# Patient Record
Sex: MALE | Race: WHITE | Employment: FULL TIME | URBAN - METROPOLITAN AREA
[De-identification: names, ages, dates, MRNs, and addresses within clinical notes are randomized per-mention and may not be internally consistent; named-entity substitution may affect disease eponyms.]

---

## 2021-04-08 ENCOUNTER — OFFICE VISIT (OUTPATIENT)
Dept: PRIMARY CARE CLINIC | Age: 26
End: 2021-04-08
Payer: OTHER GOVERNMENT

## 2021-04-08 VITALS
HEART RATE: 105 BPM | BODY MASS INDEX: 31.22 KG/M2 | OXYGEN SATURATION: 98 % | HEIGHT: 69 IN | TEMPERATURE: 97.9 F | WEIGHT: 210.8 LBS | SYSTOLIC BLOOD PRESSURE: 126 MMHG | DIASTOLIC BLOOD PRESSURE: 74 MMHG

## 2021-04-08 DIAGNOSIS — Z13.220 SCREENING CHOLESTEROL LEVEL: ICD-10-CM

## 2021-04-08 DIAGNOSIS — Z13.1 DIABETES MELLITUS SCREENING: ICD-10-CM

## 2021-04-08 DIAGNOSIS — M51.26 LUMBAR HERNIATED DISC: ICD-10-CM

## 2021-04-08 DIAGNOSIS — Z00.00 WELLNESS EXAMINATION: Primary | ICD-10-CM

## 2021-04-08 PROCEDURE — 99385 PREV VISIT NEW AGE 18-39: CPT | Performed by: FAMILY MEDICINE

## 2021-04-08 ASSESSMENT — ENCOUNTER SYMPTOMS: RESPIRATORY NEGATIVE: 1

## 2021-04-08 ASSESSMENT — PATIENT HEALTH QUESTIONNAIRE - PHQ9
SUM OF ALL RESPONSES TO PHQ QUESTIONS 1-9: 0
SUM OF ALL RESPONSES TO PHQ QUESTIONS 1-9: 0
SUM OF ALL RESPONSES TO PHQ9 QUESTIONS 1 & 2: 0
SUM OF ALL RESPONSES TO PHQ QUESTIONS 1-9: 0

## 2021-04-08 NOTE — PROGRESS NOTES
[address] [facility] PRIMARY CARE  [address] [facility] [address]  Dept: Farrukh Phelps is a 23 y.o. male New patient, who presents today for his medical conditions/complaintsas noted below. Chief Complaint   Patient presents with    New Patient     Pt would like to discuss plan for back pain    Referral - General     PT       HPI:     HPI  Diet: healthy  Exercises regularly  Can't run or lift heavy weights. Sees dentist: last year  Haven't seen eye doctor. 2 herniated discs in lumbar spine at previous post.   Back pain and radiates to buttocks, pinching pain. No change in body position. Problems x [id number]. Current in [facility],  and     Reviewed prior notes None  Reviewed previous none    No results found for: Toya Pearson    (goal LDL is <100)   No results found for: AST, ALT, BUN, LABA1C, TSH  BP Readings from Last 3 Encounters:   04/08/21 126/74          (goal 120/80)    Past Medical History:   Diagnosis Date    Chronic back pain       History reviewed. No pertinent surgical history. Family History   Problem Relation Age of Onset    Heart Failure Mother     Diabetes Father        Social History     Tobacco Use    Smoking status: Never Smoker    Smokeless tobacco: Never Used   Substance Use Topics    Alcohol use: Never     Frequency: Never      No current outpatient medications on file. No current facility-administered medications for this visit.       No Known Allergies    Health Maintenance   Topic Date Due    Hepatitis C screen  Never done    Varicella vaccine (1 of 2 - 2-dose childhood series) Never done    HPV vaccine (1 - Male 2-dose series) Never done    HIV screen  Never done    COVID-19 Vaccine (1) Never done    DTaP/Tdap/Td vaccine (1 - Tdap) Never done    Flu vaccine (Season Ended) 09/01/2021    Hepatitis A vaccine  Aged Out    Hepatitis B vaccine  Aged Out    Hib vaccine  Aged C/ Dariel Fredi 19 Meningococcal (ACWY) vaccine  Aged Out    Pneumococcal 0-64 years Vaccine  Aged Out       Subjective:      Review of Systems   Respiratory: Negative. Cardiovascular: Negative. Objective:     /74   Pulse 105   Temp 97.9 °F (36.6 °C)   Ht 5' 8.98\" (1.752 m)   Wt 210 lb 12.8 oz (95.6 kg)   SpO2 98%   BMI 31.15 kg/m²   Physical Exam  Vitals signs and nursing note reviewed. Constitutional:       General: He is not in acute distress. Appearance: He is well-developed. He is not diaphoretic. HENT:      Head: Normocephalic and atraumatic. Right Ear: Tympanic membrane, ear canal and external ear normal.      Left Ear: Tympanic membrane, ear canal and external ear normal.      Mouth/Throat:      Pharynx: No oropharyngeal exudate. Eyes:      General:         Right eye: No discharge. Left eye: No discharge. Conjunctiva/sclera: Conjunctivae normal.      Pupils: Pupils are equal, round, and reactive to light. Neck:      Thyroid: No thyromegaly. Trachea: No tracheal deviation. Cardiovascular:      Rate and Rhythm: Normal rate and regular rhythm. Heart sounds: Normal heart sounds. No murmur. Comments: No carotid bruits      Pulmonary:      Effort: Pulmonary effort is normal. No respiratory distress. Breath sounds: Normal breath sounds. No wheezing. Abdominal:      General: Bowel sounds are normal. There is no distension. Palpations: Abdomen is soft. Tenderness: There is no abdominal tenderness. Musculoskeletal:      Right lower leg: No edema. Left lower leg: No edema. Comments: Lumbar ROM wnl, no pain on palpitation. Lymphadenopathy:      Cervical: No cervical adenopathy. Skin:     General: Skin is warm and dry. Findings: No erythema. Neurological:      Mental Status: He is alert and oriented to person, place, and time. Cranial Nerves: No cranial nerve deficit.       Deep Tendon Reflexes:      Reflex Scores: Patellar reflexes are 3+ on the right side and 2+ on the left side. Achilles reflexes are 2+ on the right side and 1+ on the left side. Comments: Toes dorsiflexion 5/5  Knees extension strength: 4+/5 bilaerally  Neg SLR  Piriformis stretch wnl. Feels tightness     Psychiatric:         Mood and Affect: Mood normal.         Behavior: Behavior normal.         Thought Content: Thought content normal.         Assessment:       Diagnosis Orders   1. Wellness examination  Lipid Panel    Glucose, Fasting   2. Lumbar herniated disc  External Referral To Physical Therapy   3. Diabetes mellitus screening  Glucose, Fasting   4. Screening cholesterol level  Lipid Panel        Plan:      No follow-ups on file. Needs copy of MR report and shot records  PT for back, call prn. Orders Placed This Encounter   Procedures    Lipid Panel     Standing Status:   Future     Standing Expiration Date:   4/8/2022     Order Specific Question:   Is Patient Fasting?/# of Hours     Answer:   yes    Glucose, Fasting     Standing Status:   Future     Standing Expiration Date:   4/8/2022    External Referral To Physical Therapy     Referral Priority:   Routine     Referral Type:   Eval and Treat     Referral Reason:   Specialty Services Required     Requested Specialty:   Physical Therapy     Number of Visits Requested:   1     No orders of the defined types were placed in this encounter. Patient given educationalmaterials - see patient instructions. Discussed use, benefit, and side effectsof prescribed medications. All patient questions answered. Pt voiced understanding. Reviewed health maintenance. Instructed to increase veggies in diet andexercise. Patient agreed with treatment plan. Follow up as directed.      Electronicallysigned by Aida Holden MD on 4/8/2021 at 3:35 PM

## 2021-04-08 NOTE — PATIENT INSTRUCTIONS
extension   1. Lie on your stomach, with your face down and your elbows tucked into your sides and under your shoulders. 2. Press your elbows down into the floor to raise your upper back. 3. Hold this position for 15 to 30 seconds. 4. Repeat 2 to 4 times. · As you do this, relax your stomach muscles and allow your back to arch without using your back muscles. · Let your low back relax completely as you arch up. Don't let your hips or pelvis come off the floor. Then relax, and return to the start position. Over time, work up to staying in the press-up position for up to 2 minutes. If lying in this position causes or increases pain down your leg, stop this exercise and do not do the next exercises. 3. Full press-up back extension   1. Lie on your stomach with your face down, keeping your elbows tucked into your sides and under your shoulders. 2. Straighten your elbows, and push your upper body up as far as you can. 3. Hold this position for 5 seconds, and then relax. 4. Repeat 10 times. Allow your lower back to sag. Keep your hips, pelvis, and legs relaxed. Each time, try to raise your upper body a little higher and hold your arms a bit straighter. If lying in this position causes or increases pain down your leg, stop this exercise and do not do the next exercises. If you can't do this exercise, you may instead try the backward bend exercise that follows. 4. Backward bend   1. Stand with your feet hip-width apart, and don't lock your knees. 2. Place your hands in the small of your back. 3. Bend backward as far as you can, keeping your knees straight. 4. Repeat 2 to 4 times. Your toes should be pointing forward. Hold this position for 2 to 3 seconds. Then return to your starting position. Each time, try to bend backward a little farther, until you bend backward as far as you can. If standing in this position causes or increases pain down your leg, stop this exercise.     Follow-up care is a

## 2021-04-14 ENCOUNTER — TELEPHONE (OUTPATIENT)
Dept: PRIMARY CARE CLINIC | Age: 26
End: 2021-04-14

## 2021-04-14 NOTE — TELEPHONE ENCOUNTER
Pt stated that he has been busy and will call office when he finds a PT facility that he would like to attend.

## 2021-04-22 ENCOUNTER — HOSPITAL ENCOUNTER (OUTPATIENT)
Age: 26
Setting detail: SPECIMEN
Discharge: HOME | End: 2021-04-22
Payer: OTHER GOVERNMENT

## 2021-04-22 DIAGNOSIS — Z13.220 SCREENING CHOLESTEROL LEVEL: ICD-10-CM

## 2021-04-22 DIAGNOSIS — Z13.1 DIABETES MELLITUS SCREENING: ICD-10-CM

## 2021-04-22 DIAGNOSIS — Z00.00 WELLNESS EXAMINATION: ICD-10-CM

## 2021-04-22 LAB
CHOLESTEROL/HDL RATIO: 4
CHOLESTEROL: 175 MG/DL
GLUCOSE FASTING: 99 MG/DL (ref 70–99)
HDLC SERPL-MCNC: 44 MG/DL
LDL CHOLESTEROL: 114 MG/DL (ref 0–130)
TRIGL SERPL-MCNC: 87 MG/DL
VLDLC SERPL CALC-MCNC: NORMAL MG/DL (ref 1–30)

## 2021-04-26 ENCOUNTER — TELEPHONE (OUTPATIENT)
Dept: PRIMARY CARE CLINIC | Age: 26
End: 2021-04-26

## 2021-04-26 NOTE — TELEPHONE ENCOUNTER
Patient is going to [facility]  at [facility] for physical therapy. He needs a  referral. Please update pt with response once received.

## 2021-08-30 ENCOUNTER — OFFICE VISIT (OUTPATIENT)
Dept: FAMILY MEDICINE CLINIC | Age: 26
End: 2021-08-30
Payer: OTHER GOVERNMENT

## 2021-08-30 ENCOUNTER — HOSPITAL ENCOUNTER (OUTPATIENT)
Age: 26
Setting detail: SPECIMEN
Discharge: HOME | End: 2021-08-30
Payer: OTHER GOVERNMENT

## 2021-08-30 VITALS — WEIGHT: 210 LBS | BODY MASS INDEX: 31.83 KG/M2 | OXYGEN SATURATION: 98 % | HEIGHT: 68 IN | RESPIRATION RATE: 12 BRPM

## 2021-08-30 DIAGNOSIS — Z11.52 ENCOUNTER FOR SCREENING FOR COVID-19: ICD-10-CM

## 2021-08-30 DIAGNOSIS — J06.9 VIRAL UPPER RESPIRATORY INFECTION: Primary | ICD-10-CM

## 2021-08-30 PROCEDURE — 99212 OFFICE O/P EST SF 10 MIN: CPT | Performed by: NURSE PRACTITIONER

## 2021-08-30 ASSESSMENT — ENCOUNTER SYMPTOMS
COUGH: 1
SINUS PRESSURE: 1

## 2021-08-30 NOTE — PROGRESS NOTES
[address] WALK-IN  [address] [place] [address]  Dept: [phone]  Dept Fax: [phone]    Date of Visit:  2021  Patient Name: Allie Phelps   Patient :  1995     CHIEF COMPLAINT:     Allie Phelps is a 23 y.o. male who presents today is c/o of Sinus Problem (x3 days daughter is sick and is in ) and Cough          REVIEW OF SYSTEM      Review of Systems   HENT: Positive for congestion, postnasal drip and sinus pressure. Respiratory: Positive for cough. All other systems reviewed and are negative. HISTORY OF PRESENT ILLNESS     Patient presents with his wife and child with c/o upper respiratory sinus symptoms for the past 3 days. Patient and his wife are all experiencing symptoms. He has been fully vaccinated. His daughter recently started  and due to COVID related symptoms, needs to have a negative PCR test to return to school. Twin Pand[place] REVIEWED INFORMATION      No Known Allergies    There is no problem list on file for this patient. Past Medical History:   Diagnosis Date    Chronic back pain        History reviewed. No pertinent surgical history. PHYSICAL EXAM     Resp 12   Ht 5' 8\" (1.727 m)   Wt 210 lb (95.3 kg)   SpO2 98%   BMI 31.93 kg/m²    Physical Exam  HENT:      Right Ear: Tympanic membrane normal.      Left Ear: Tympanic membrane normal.      Nose: Congestion present. Mouth/Throat:      Pharynx: Posterior oropharyngeal erythema present. No oropharyngeal exudate. Comments: Post nasal drainage  Cardiovascular:      Rate and Rhythm: Normal rate and regular rhythm. Pulses: Normal pulses. Heart sounds: Normal heart sounds. Pulmonary:      Effort: Pulmonary effort is normal.      Breath sounds: Normal breath sounds. Abdominal:      General: Bowel sounds are normal.   Skin:     General: Skin is warm and dry. Neurological:      Mental Status: He is alert.

## 2021-08-30 NOTE — PATIENT INSTRUCTIONS
Patient Education        Viral Respiratory Infection: Care Instructions  Your Care Instructions     Viruses are very small organisms. They grow in number after they enter your body. There are many types that cause different illnesses, such as colds and the mumps. The symptoms of a viral respiratory infection often start quickly. They include a fever, sore throat, and runny nose. You may also just not feel well. Or you may not want to eat much. Most viral respiratory infections are not serious. They usually get better with time and self-care. Antibiotics are not used to treat a viral infection. That's because antibiotics will not help cure a viral illness. In some cases, antiviral medicine can help your body fight a serious viral infection. Follow-up care is a key part of your treatment and safety. Be sure to make and go to all appointments, and call your doctor if you are having problems. It's also a good idea to know your test results and keep a list of the medicines you take. How can you care for yourself at home? · Rest as much as possible until you feel better. · Be safe with medicines. Take your medicine exactly as prescribed. Call your doctor if you think you are having a problem with your medicine. You will get more details on the specific medicine your doctor prescribes. · Take an over-the-counter pain medicine, such as acetaminophen (Tylenol), ibuprofen (Advil, Motrin), or naproxen (Aleve), as needed for pain and fever. Read and follow all instructions on the label. Do not give aspirin to anyone younger than 20. It has been linked to Reye syndrome, a serious illness. · Drink plenty of fluids. Hot fluids, such as tea or soup, may help relieve congestion in your nose and throat. If you have kidney, heart, or liver disease and have to limit fluids, talk with your doctor before you increase the amount of fluids you drink. · Try to clear mucus from your lungs by breathing deeply and coughing.   · Gargle

## 2021-08-31 ENCOUNTER — PATIENT MESSAGE (OUTPATIENT)
Dept: PRIMARY CARE CLINIC | Age: 26
End: 2021-08-31

## 2021-08-31 DIAGNOSIS — Z11.52 ENCOUNTER FOR SCREENING FOR COVID-19: ICD-10-CM

## 2021-08-31 LAB
SARS-COV-2: NORMAL
SARS-COV-2: NOT DETECTED
SOURCE: NORMAL

## 2021-09-02 ENCOUNTER — OFFICE VISIT (OUTPATIENT)
Dept: PRIMARY CARE CLINIC | Age: 26
End: 2021-09-02
Payer: OTHER GOVERNMENT

## 2021-09-02 VITALS
DIASTOLIC BLOOD PRESSURE: 80 MMHG | OXYGEN SATURATION: 96 % | BODY MASS INDEX: 34.18 KG/M2 | HEART RATE: 84 BPM | SYSTOLIC BLOOD PRESSURE: 120 MMHG | WEIGHT: 224.8 LBS

## 2021-09-02 DIAGNOSIS — R10.31 GROIN PAIN, CHRONIC, RIGHT: Primary | ICD-10-CM

## 2021-09-02 DIAGNOSIS — Z23 NEED FOR INFLUENZA VACCINATION: ICD-10-CM

## 2021-09-02 DIAGNOSIS — N50.811 PAIN IN RIGHT TESTICLE: ICD-10-CM

## 2021-09-02 DIAGNOSIS — G89.29 GROIN PAIN, CHRONIC, RIGHT: Primary | ICD-10-CM

## 2021-09-02 PROCEDURE — 99213 OFFICE O/P EST LOW 20 MIN: CPT | Performed by: FAMILY MEDICINE

## 2021-09-02 PROCEDURE — 90674 CCIIV4 VAC NO PRSV 0.5 ML IM: CPT | Performed by: FAMILY MEDICINE

## 2021-09-02 PROCEDURE — 90471 IMMUNIZATION ADMIN: CPT | Performed by: FAMILY MEDICINE

## 2021-09-02 RX ORDER — AMOXICILLIN 500 MG/1
CAPSULE ORAL
COMMUNITY
Start: 2021-08-27 | End: 2021-11-19 | Stop reason: ALTCHOICE

## 2021-09-02 SDOH — ECONOMIC STABILITY: FOOD INSECURITY: WITHIN THE PAST 12 MONTHS, YOU WORRIED THAT YOUR FOOD WOULD RUN OUT BEFORE YOU GOT MONEY TO BUY MORE.: NEVER TRUE

## 2021-09-02 SDOH — ECONOMIC STABILITY: FOOD INSECURITY: WITHIN THE PAST 12 MONTHS, THE FOOD YOU BOUGHT JUST DIDN'T LAST AND YOU DIDN'T HAVE MONEY TO GET MORE.: NEVER TRUE

## 2021-09-02 ASSESSMENT — PATIENT HEALTH QUESTIONNAIRE - PHQ9
SUM OF ALL RESPONSES TO PHQ QUESTIONS 1-9: 0
2. FEELING DOWN, DEPRESSED OR HOPELESS: 0
1. LITTLE INTEREST OR PLEASURE IN DOING THINGS: 0
SUM OF ALL RESPONSES TO PHQ9 QUESTIONS 1 & 2: 0
SUM OF ALL RESPONSES TO PHQ QUESTIONS 1-9: 0
SUM OF ALL RESPONSES TO PHQ QUESTIONS 1-9: 0

## 2021-09-02 ASSESSMENT — SOCIAL DETERMINANTS OF HEALTH (SDOH): HOW HARD IS IT FOR YOU TO PAY FOR THE VERY BASICS LIKE FOOD, HOUSING, MEDICAL CARE, AND HEATING?: NOT HARD AT ALL

## 2021-09-02 NOTE — PROGRESS NOTES
 Hepatitis B vaccine  Aged Out    Hib vaccine  Aged Out    Meningococcal (ACWY) vaccine  Aged Out    Pneumococcal 0-64 years Vaccine  Aged Out       Subjective:      Review of Systems   Constitutional: Negative. Objective:     /80   Pulse 84   Wt 224 lb 12.8 oz (102 kg)   SpO2 96%   BMI 34.18 kg/m²   Physical Exam  Vitals and nursing note reviewed. Exam conducted with a chaperone present. Constitutional:       General: He is not in acute distress. Appearance: He is well-developed. He is not ill-appearing. HENT:      Head: Normocephalic and atraumatic. Right Ear: External ear normal.      Left Ear: External ear normal.   Eyes:      General: No scleral icterus. Right eye: No discharge. Left eye: No discharge. Conjunctiva/sclera: Conjunctivae normal.   Neck:      Thyroid: No thyromegaly. Trachea: No tracheal deviation. Cardiovascular:      Rate and Rhythm: Normal rate and regular rhythm. Heart sounds: Normal heart sounds. Pulmonary:      Effort: Pulmonary effort is normal. No respiratory distress. Breath sounds: Normal breath sounds. No wheezing. Genitourinary:     Penis: Normal and circumcised. Comments: Small cystic lesion superior to the right testes, not tender to palpation on cyst or testes  No pain on palpation of groin crease right   No hernia palpated right. Lymphadenopathy:      Cervical: No cervical adenopathy. Skin:     General: Skin is warm. Findings: No rash. Neurological:      Mental Status: He is alert and oriented to person, place, and time. Psychiatric:         Mood and Affect: Mood normal.         Behavior: Behavior normal.         Thought Content: Thought content normal.         Assessment:       Diagnosis Orders   1. Groin pain, chronic, right  AFL - Cherylene Durie, MD, Urology, [place]   2. Pain in right testicle  AFL - Cherylene Durie, MD, Urology, [place]   3.  Need for influenza vaccination INFLUENZA, MDCK QUADV, 2 YRS AND OLDER, IM, PF, PREFILL SYR OR SDV, 0.5ML (FLUCELVAX QUADV, PF)        Plan:      No follow-ups on file. See urology  It could be a ligament or nerve issue, I don't think the cyst is causing the pain unless it's on a nerve    Orders Placed This Encounter   Procedures    INFLUENZA, MDCK QUADV, 2 YRS AND OLDER, IM, PF, PREFILL SYR OR SDV, 0.5ML (Gerard Lambert, PF)   Danilo Contreras MD, Urology, [facility]     Referral Priority:   Routine     Referral Type:   Eval and Treat     Referral Reason:   Specialty Services Required     Referred to Provider:   Malick Jesus MD     Requested Specialty:   Urology     Number of Visits Requested:   1     No orders of the defined types were placed in this encounter. Patient given educationalmaterials - see patient instructions. Discussed use, benefit, and side effectsof prescribed medications. All patient questions answered. Pt voiced understanding. Reviewed health maintenance. Instructed to continue current medications, diet andexercise. Patient agreed with treatment plan. Follow up as directed.      Electronicallysigned by Dayton Cespedes MD on 9/2/2021 at 9:05 AM

## 2021-09-02 NOTE — PATIENT INSTRUCTIONS
vaccine:  · Has had an allergic reaction after a previous dose of influenza vaccine, or has any severe, life-threatening allergies. · Has ever had Guillain-Barré Syndrome (also called GBS). In some cases, your health care provider may decide to postpone influenza vaccination to a future visit. People with minor illnesses, such as a cold, may be vaccinated. People who are moderately or severely ill should usually wait until they recover before getting influenza vaccine. Your health care provider can give you more information. Risks of a vaccine reaction  · Soreness, redness, and swelling where shot is given, fever, muscle aches, and headache can happen after influenza vaccine. · There may be a very small increased risk of Guillain-Barré Syndrome (GBS) after inactivated influenza vaccine (the flu shot). Hiu Speck children who get the flu shot along with pneumococcal vaccine (PCV13), and/or DTaP vaccine at the same time might be slightly more likely to have a seizure caused by fever. Tell your health care provider if a child who is getting flu vaccine has ever had a seizure. People sometimes faint after medical procedures, including vaccination. Tell your provider if you feel dizzy or have vision changes or ringing in the ears. As with any medicine, there is a very remote chance of a vaccine causing a severe allergic reaction, other serious injury, or death. What if there is a serious problem? An allergic reaction could occur after the vaccinated person leaves the clinic. If you see signs of a severe allergic reaction (hives, swelling of the face and throat, difficulty breathing, a fast heartbeat, dizziness, or weakness), call 9-1-1 and get the person to the nearest hospital.  For other signs that concern you, call your health care provider. Adverse reactions should be reported to the Vaccine Adverse Event Reporting System (VAERS).  Your health care provider will usually file this report, or you can do it yourself. Visit the VAERS website at www.vaers. hhs.gov or call 3-348.472.9909. VAERS is only for reporting reactions, and VAERS staff do not give medical advice. The National Vaccine Injury Compensation Program  The National Vaccine Injury Compensation Program (VICP) is a federal program that was created to compensate people who may have been injured by certain vaccines. Visit the VICP website at www.hrsa.gov/vaccinecompensation or call 9-595.963.1412 to learn about the program and about filing a claim. There is a time limit to file a claim for compensation. How can I learn more? · Ask your healthcare provider. · Call your local or state health department. · Contact the Centers for Disease Control and Prevention (CDC):  ? Call 8-488.385.4467 (1-800-CDC-INFO) or  ? Visit CDC's website at www.cdc.gov/flu  Vaccine Information Statement (Interim)  Inactivated Influenza Vaccine  8/15/2019  42 UAkira Renner [id number]-[id number]  Department of Health and Human Services  Centers for Disease Control and Prevention  Many Vaccine Information Statements are available in Indonesian and other languages. See www.immunize.org/vis. Muchas hojas de información sobre vacunas están disponibles en español y en otros idiomas. Visite www.immunize.org/vis. Care instructions adapted under license by [facility] ([facility]). If you have questions about a medical condition or this instruction, always ask your healthcare professional. [place] [address] any warranty or liability for your use of this information.

## 2021-09-30 ENCOUNTER — HOSPITAL ENCOUNTER (EMERGENCY)
Age: 26
Discharge: HOME | End: 2021-09-30
Attending: EMERGENCY MEDICINE
Payer: OTHER GOVERNMENT

## 2021-09-30 VITALS
HEART RATE: 90 BPM | RESPIRATION RATE: 15 BRPM | HEIGHT: 69 IN | BODY MASS INDEX: 33.33 KG/M2 | DIASTOLIC BLOOD PRESSURE: 78 MMHG | WEIGHT: 225 LBS | SYSTOLIC BLOOD PRESSURE: 133 MMHG | OXYGEN SATURATION: 99 % | TEMPERATURE: 98.2 F

## 2021-09-30 DIAGNOSIS — R11.0 NAUSEA: Primary | ICD-10-CM

## 2021-09-30 DIAGNOSIS — R19.7 DIARRHEA, UNSPECIFIED TYPE: ICD-10-CM

## 2021-09-30 LAB
ABSOLUTE EOS #: 0 K/UL (ref 0–0.4)
ABSOLUTE IMMATURE GRANULOCYTE: ABNORMAL K/UL (ref 0–0.3)
ABSOLUTE LYMPH #: 1.2 K/UL (ref 1–4.8)
ABSOLUTE MONO #: 0.8 K/UL (ref 0.1–1.2)
ALBUMIN SERPL-MCNC: 4.3 G/DL (ref 3.5–5.2)
ALBUMIN/GLOBULIN RATIO: 1.3 (ref 1–2.5)
ALP BLD-CCNC: 51 U/L (ref 40–129)
ALT SERPL-CCNC: 21 U/L (ref 5–41)
ANION GAP SERPL CALCULATED.3IONS-SCNC: 15 MMOL/L (ref 9–17)
AST SERPL-CCNC: 19 U/L
BASOPHILS # BLD: 0 % (ref 0–2)
BASOPHILS ABSOLUTE: 0 K/UL (ref 0–0.2)
BILIRUB SERPL-MCNC: 0.5 MG/DL (ref 0.3–1.2)
BUN BLDV-MCNC: 18 MG/DL (ref 6–20)
BUN/CREAT BLD: ABNORMAL (ref 9–20)
CALCIUM SERPL-MCNC: 9.6 MG/DL (ref 8.6–10.4)
CHLORIDE BLD-SCNC: 96 MMOL/L (ref 98–107)
CO2: 19 MMOL/L (ref 20–31)
CREAT SERPL-MCNC: 1.33 MG/DL (ref 0.7–1.2)
DIFFERENTIAL TYPE: ABNORMAL
EOSINOPHILS RELATIVE PERCENT: 1 % (ref 1–4)
GFR AFRICAN AMERICAN: >60 ML/MIN
GFR NON-AFRICAN AMERICAN: >60 ML/MIN
GFR SERPL CREATININE-BSD FRML MDRD: ABNORMAL ML/MIN/{1.73_M2}
GFR SERPL CREATININE-BSD FRML MDRD: ABNORMAL ML/MIN/{1.73_M2}
GLUCOSE BLD-MCNC: 89 MG/DL (ref 70–99)
HCT VFR BLD CALC: 51.9 % (ref 41–53)
HEMOGLOBIN: 17.3 G/DL (ref 13.5–17.5)
IMMATURE GRANULOCYTES: ABNORMAL %
LIPASE: 18 U/L (ref 13–60)
LYMPHOCYTES # BLD: 23 % (ref 24–44)
MCH RBC QN AUTO: 28.9 PG (ref 26–34)
MCHC RBC AUTO-ENTMCNC: 33.3 G/DL (ref 31–37)
MCV RBC AUTO: 86.6 FL (ref 80–100)
MONOCYTES # BLD: 15 % (ref 2–11)
NRBC AUTOMATED: ABNORMAL PER 100 WBC
PDW BLD-RTO: 12.8 % (ref 12.5–15.4)
PLATELET # BLD: 218 K/UL (ref 140–450)
PLATELET ESTIMATE: ABNORMAL
PMV BLD AUTO: 7.5 FL (ref 6–12)
POTASSIUM SERPL-SCNC: 3.9 MMOL/L (ref 3.7–5.3)
RBC # BLD: 5.99 M/UL (ref 4.5–5.9)
RBC # BLD: ABNORMAL 10*6/UL
SEG NEUTROPHILS: 61 % (ref 36–66)
SEGMENTED NEUTROPHILS ABSOLUTE COUNT: 3.3 K/UL (ref 1.8–7.7)
SODIUM BLD-SCNC: 130 MMOL/L (ref 135–144)
TOTAL PROTEIN: 7.6 G/DL (ref 6.4–8.3)
WBC # BLD: 5.4 K/UL (ref 3.5–11)
WBC # BLD: ABNORMAL 10*3/UL

## 2021-09-30 PROCEDURE — U0005 INFEC AGEN DETEC AMPLI PROBE: HCPCS

## 2021-09-30 PROCEDURE — 96361 HYDRATE IV INFUSION ADD-ON: CPT

## 2021-09-30 PROCEDURE — 2580000003 HC RX 258: Performed by: PHYSICIAN ASSISTANT

## 2021-09-30 PROCEDURE — 80053 COMPREHEN METABOLIC PANEL: CPT

## 2021-09-30 PROCEDURE — 83690 ASSAY OF LIPASE: CPT

## 2021-09-30 PROCEDURE — 6360000002 HC RX W HCPCS: Performed by: PHYSICIAN ASSISTANT

## 2021-09-30 PROCEDURE — 99284 EMERGENCY DEPT VISIT MOD MDM: CPT

## 2021-09-30 PROCEDURE — 85025 COMPLETE CBC W/AUTO DIFF WBC: CPT

## 2021-09-30 PROCEDURE — 36415 COLL VENOUS BLD VENIPUNCTURE: CPT

## 2021-09-30 PROCEDURE — 96374 THER/PROPH/DIAG INJ IV PUSH: CPT

## 2021-09-30 PROCEDURE — U0003 INFECTIOUS AGENT DETECTION BY NUCLEIC ACID (DNA OR RNA); SEVERE ACUTE RESPIRATORY SYNDROME CORONAVIRUS 2 (SARS-COV-2) (CORONAVIRUS DISEASE [COVID-19]), AMPLIFIED PROBE TECHNIQUE, MAKING USE OF HIGH THROUGHPUT TECHNOLOGIES AS DESCRIBED BY CMS-2020-01-R: HCPCS

## 2021-09-30 RX ORDER — 0.9 % SODIUM CHLORIDE 0.9 %
1000 INTRAVENOUS SOLUTION INTRAVENOUS ONCE
Status: COMPLETED | OUTPATIENT
Start: 2021-09-30 | End: 2021-09-30

## 2021-09-30 RX ORDER — ONDANSETRON 4 MG/1
4 TABLET, FILM COATED ORAL EVERY 8 HOURS PRN
Qty: 20 TABLET | Refills: 0 | Status: SHIPPED | OUTPATIENT
Start: 2021-09-30 | End: 2022-03-22

## 2021-09-30 RX ORDER — ONDANSETRON 2 MG/ML
4 INJECTION INTRAMUSCULAR; INTRAVENOUS ONCE
Status: COMPLETED | OUTPATIENT
Start: 2021-09-30 | End: 2021-09-30

## 2021-09-30 RX ADMIN — ONDANSETRON 4 MG: 2 INJECTION INTRAMUSCULAR; INTRAVENOUS at 10:47

## 2021-09-30 RX ADMIN — SODIUM CHLORIDE 1000 ML: 9 INJECTION, SOLUTION INTRAVENOUS at 10:45

## 2021-09-30 ASSESSMENT — PAIN DESCRIPTION - DESCRIPTORS: DESCRIPTORS: CRAMPING

## 2021-09-30 ASSESSMENT — PAIN DESCRIPTION - ORIENTATION: ORIENTATION: MID

## 2021-09-30 ASSESSMENT — PAIN SCALES - GENERAL: PAINLEVEL_OUTOF10: 4

## 2021-09-30 ASSESSMENT — PAIN DESCRIPTION - FREQUENCY: FREQUENCY: CONTINUOUS

## 2021-09-30 ASSESSMENT — PAIN DESCRIPTION - PAIN TYPE: TYPE: ACUTE PAIN

## 2021-09-30 ASSESSMENT — PAIN DESCRIPTION - LOCATION: LOCATION: ABDOMEN

## 2021-09-30 NOTE — ED PROVIDER NOTES
[id number] Replaced by [facility]  [address] [place] [address]  Phone: [phone]  Fax: Isidra Riddle 1005      Pt Name: Hayes Daniel  MRN: [id number]  [place] 1995  Date of evaluation: 9/30/21      CHIEF COMPLAINT:  Chief Complaint   Patient presents with    Abdominal Pain    Diarrhea       HISTORY OF PRESENT ILLNESS    Hayes Daniel is a 23 y.o. male who presents with GI complaints:       Timing/Onset:   4 days  Context/Setting: Patient here for evaluation of persistent nonbloody diarrhea as well as significant nausea which is impacting his fluid intake. Patient has had very little vomiting but just does not feel like he is getting enough fluid in due to his nausea. Patient has had intermittent fevers with one this morning as well that was around 100.4. Patient has had a little bit of some very mild sore throat but otherwise denies any other URI symptoms. Patient denies any chest or respiratory symptoms. Patient states he is making very little urine. Patient was Covid tested and evaluated in urgent care 2 days ago with a negative rapid Covid. Patient states his family members at home have all similar symptoms, none of them have been tested. Patient does confirm he has Covid vaccinated. Patient has no other pertinent past medical history today symptoms. Quality:    achy  crampy   Duration:   intermittent  Modifying Factors:  Post-prandial    Severity: moderate        Nursing Notes were reviewed. REVIEW OF SYSTEMS       Constitutional: per HPI  Eyes: No visual changes. Neck: No neck pain. Respiratory: Denies recent shortness of breath. Cardiac:  Denies recent chest pain or palpitations  GI:  Per HPI  : Per HPI    Musculoskeletal: per HPI   Neurologic: Denies headache or focal weakness. Skin:  Denies any rash. Negative in 10 essential Systems except as mentioned above and in the HPI.       PAST MEDICAL HISTORY   PMH:  has a past medical history of Chronic back pain. Surgical History:  has no past surgical history on file. Social History:  reports that he has never smoked. He has never used smokeless tobacco. He reports that he does not drink alcohol and does not use drugs. Family History: None  Psychiatric History: None    Allergies:has No Known Allergies. PHYSICAL EXAM     INITIAL VITALS: /72   Pulse 84   Temp 98.2 °F (36.8 °C) (Oral)   Resp 14   Ht 5' 9\" (1.753 m)   Wt 102.1 kg (225 lb)   SpO2 99%   BMI 33.23 kg/m²   Constitutional:  Well developed, well-appearing, no malaise. Eyes:  Pupils equal/round  HENT:  Atraumatic, external ears normal, nose normal, oropharynx decreased hydration  Respiratory:  Clear to auscultation bilaterally with good air exchange, no W/R/R  Cardiovascular:  RRR with normal S1 and S2  Gastrointestinal/Abdomen:  Soft, NT, TTP, BS present. No RLQ TTP. No mass appreciated. Musculoskeletal:  Normal to inspection  Back:  No midline/paraspinal TTP. No bilat CVA/flank TTP. Integument:  No rash. Neurologic:  Alert & age appropriate mentation-interaction      DIAGNOSTIC RESULTS     EKG: All EKG's are interpreted by the Emergency Department Physician who either signs or Co-signs this chart in the absence of a cardiologist.    Not indicated    RADIOLOGY:   Reviewed the radiologist:  No orders to display        Not indicated      LABS:  Labs Reviewed   CBC WITH AUTO DIFFERENTIAL - Abnormal; Notable for the following components:       Result Value    RBC 5.99 (*)     Lymphocytes 23 (*)     Monocytes 15 (*)     All other components within normal limits   COMPREHENSIVE METABOLIC PANEL - Abnormal; Notable for the following components:    CREATININE 1.33 (*)     Sodium 130 (*)     Chloride 96 (*)     CO2 19 (*)     All other components within normal limits   LIPASE   COVID-19         EMERGENCY DEPARTMENT COURSE, DDX and MDM:     1028  Pt here out of concern for dehydration.   Neg COVID 2 days ago, family all with

## 2021-09-30 NOTE — ED TRIAGE NOTES
Pt reports ABD LUQ pain with diarrhea started on Monday. Pt reports negative COVID on Tuesday Pt reports SOB with exertion. Pt denies vomiting. Pt reports small amount of urine output.

## 2021-09-30 NOTE — ED NOTES
Pt given written and verbal discharge, f/u instructions with reminder to p/u medication at preferred pharmacy. Pt verbalizes understanding. Pt is ambulatory with steady gait.       Annie Gold RN  09/30/21 [id number]

## 2021-10-01 ENCOUNTER — CARE COORDINATION (OUTPATIENT)
Dept: CARE COORDINATION | Age: 26
End: 2021-10-01

## 2021-10-01 LAB
SARS-COV-2: NORMAL
SARS-COV-2: NOT DETECTED
SOURCE: NORMAL

## 2021-10-01 NOTE — CARE COORDINATION
Educated patient about risk for severe COVID-19 due to risk factors according to CDC guidelines. ACM reviewed discharge instructions, medical action plan and red flag symptoms with the patient who verbalized understanding. Discussed COVID vaccination status: Yes. Education provided on COVID-19 vaccination as appropriate. Discussed exposure protocols and quarantine with CDC Guidelines. Patient was given an opportunity to verbalize any questions and concerns and agrees to contact ACM or health care provider for questions related to their healthcare. No more abd pain or diarrhea. Taking the Zofran which is helping. Notified him of negative COVID test result, which he was expecting. He already had vaccine. Encouraged he follow up with PCP.

## 2021-10-01 NOTE — ED PROVIDER NOTES
[address] [facility]  [address] [address] [facility] [address] [facility] [address]  Phone: [phone]  Fax: [phone]      Attending Physician [address]       Chief Complaint   Patient presents with    Abdominal Pain    Diarrhea       DIAGNOSTIC RESULTS     LABS:  Labs Reviewed   CBC WITH AUTO DIFFERENTIAL - Abnormal; Notable for the following components:       Result Value    RBC 5.99 (*)     Lymphocytes 23 (*)     Monocytes 15 (*)     All other components within normal limits   COMPREHENSIVE METABOLIC PANEL - Abnormal; Notable for the following components:    CREATININE 1.33 (*)     Sodium 130 (*)     Chloride 96 (*)     CO2 19 (*)     All other components within normal limits   LIPASE   COVID-19       All other labs were within normal range or not returned as of this dictation. RADIOLOGY:  No orders to display         EMERGENCY DEPARTMENT COURSE:   Vitals:    Vitals:    09/30/21 1012 09/30/21 1142 09/30/21 1207   BP: 136/89 115/72 133/78   Pulse: 126 84 90   Resp: 16 14 15   Temp: 98.2 °F (36.8 °C)     TempSrc: Oral     SpO2: 96% 99% 99%   Weight: 225 lb (102.1 kg)     Height: 5' 9\" (1.753 m)       -------------------------  BP: 133/78, Temp: 98.2 °F (36.8 °C), Pulse: 90, Resp: 15             PERTINENT ATTENDING PHYSICIAN COMMENTS:    I performed a history and physical examination of the patient and discussed management with the mid level provider. I reviewed the mid level provider's note and agree with the documented findings and plan of care. Any areas of disagreement are noted on the chart. I was personally present for the key portions of any procedures. I have documented in the chart those procedures where I was not present during the key portions. I have reviewed the emergency nurses triage note. I agree with the chief complaint, past medical history, past surgical history, allergies, medications, social and family history as documented unless otherwise noted below.

## 2021-10-18 ENCOUNTER — PATIENT MESSAGE (OUTPATIENT)
Dept: PRIMARY CARE CLINIC | Age: 26
End: 2021-10-18

## 2021-10-18 DIAGNOSIS — G89.29 GROIN PAIN, CHRONIC, RIGHT: Primary | ICD-10-CM

## 2021-10-18 DIAGNOSIS — N50.811 PAIN IN RIGHT TESTICLE: ICD-10-CM

## 2021-10-18 DIAGNOSIS — R10.31 GROIN PAIN, CHRONIC, RIGHT: Primary | ICD-10-CM

## 2021-10-18 RX ORDER — KETOROLAC TROMETHAMINE 10 MG/1
10 TABLET, FILM COATED ORAL EVERY 6 HOURS PRN
Qty: 20 TABLET | Refills: 0 | Status: SHIPPED | OUTPATIENT
Start: 2021-10-18 | End: 2021-10-18 | Stop reason: SDUPTHER

## 2021-10-18 RX ORDER — KETOROLAC TROMETHAMINE 10 MG/1
10 TABLET, FILM COATED ORAL EVERY 6 HOURS PRN
Qty: 20 TABLET | Refills: 0 | Status: SHIPPED | OUTPATIENT
Start: 2021-10-18 | End: 2022-03-22

## 2021-11-19 ENCOUNTER — OFFICE VISIT (OUTPATIENT)
Dept: UROLOGY | Age: 26
End: 2021-11-19
Payer: OTHER GOVERNMENT

## 2021-11-19 VITALS
WEIGHT: 225 LBS | TEMPERATURE: 97.5 F | HEIGHT: 69 IN | BODY MASS INDEX: 33.33 KG/M2 | SYSTOLIC BLOOD PRESSURE: 115 MMHG | DIASTOLIC BLOOD PRESSURE: 79 MMHG | HEART RATE: 81 BPM

## 2021-11-19 DIAGNOSIS — N50.812 PAIN IN BOTH TESTICLES: Primary | ICD-10-CM

## 2021-11-19 DIAGNOSIS — N50.811 PAIN IN BOTH TESTICLES: Primary | ICD-10-CM

## 2021-11-19 DIAGNOSIS — Z98.52 HISTORY OF VASECTOMY: ICD-10-CM

## 2021-11-19 PROCEDURE — 99203 OFFICE O/P NEW LOW 30 MIN: CPT | Performed by: UROLOGY

## 2021-11-19 ASSESSMENT — ENCOUNTER SYMPTOMS
ABDOMINAL PAIN: 0
WHEEZING: 0
BACK PAIN: 1
SHORTNESS OF BREATH: 0
EYE REDNESS: 0
NAUSEA: 0
DIARRHEA: 0
EYE PAIN: 0
CONSTIPATION: 0
COUGH: 0
VOMITING: 0

## 2021-11-19 NOTE — LETTER
[address] [address]  Dept: [phone]  Dept Fax: [phone]        11/19/21    Patient: Taya Pillai  YOB: 1995    Dear Andrew Soraino MD,    I had the pleasure of seeing one of your patients, Holly Galeana today in the office today. Below are the relevant portions of my assessment and plan of care. IMPRESSION:  1. Pain in both testicles    2. History of vasectomy        PLAN:  He has some pain with ejaculation. Likely related to muscle strain  recommend motrin   If no improvement in next 6 weeks, will obtain ultrasound and consider pelvic floor PT  Semen analysis ordered, as he wants to make sure nothing has happened with his vas. Prescriptions Ordered:  No orders of the defined types were placed in this encounter. Orders Placed:  Orders Placed This Encounter   Procedures    Semen analysis     Obtain specimen after abstaining from sexual intercourse or masturbation 72 hour prior to submitting specimen     Standing Status:   Future     Standing Expiration Date:   11/19/2022         Thank you for allowing me to participate in the care of this patient. I will keep you updated on this patient's follow up and I look forward to serving you and your patients again in the future.         Rk Acevedo MD

## 2021-11-19 NOTE — PROGRESS NOTES
[address] [address]  Dept: [phone]  Dept Fax: [phone] [facility] Urology Office Note - New patient    Patient:  Mihaela Hanson  YOB: 1995  Date: 11/19/2021    The patient is a 23 y.o. male who presents todayfor evaluation of the following problems:   Chief Complaint   Patient presents with    New Patient     Referred by Dr. Anahy Ray Groin Pain     started two months ago, comes and goes.  Testicle Pain    referred by Angel Paul MD.      HPI  He is here for groin pain. He had a vasectomy last year in [place]. He has 3 kids. He had an uneventful recovery. The pain started about 2 months ago. He has pain before ejaculation, which lingers. The pain is mainly on the right side, but also some on the left. No voiding complaints. (Patient's old records have been requested, reviewed and summarized in today's note.)    Summary of old records: N/A    Additional History: N/A    Procedures Today: N/A    Last several PSA's:  No results found for: PSA  Last total testosterone:  No results found for: TESTOSTERONE  Urinalysis today:  No results found for this visit on 11/19/21. AUA Symptom Score (11/19/2021):                               Last BUN and creatinine:  Lab Results   Component Value Date    BUN 18 09/30/2021     Lab Results   Component Value Date    CREATININE 1.33 (H) 09/30/2021       Additional Lab/Culture results: none    Imaging Reviewed during this Office Visit: none  (results were independently reviewed by physician and radiology report verified)    PAST MEDICAL, FAMILY AND SOCIAL HISTORY:  Past Medical History:   Diagnosis Date    Chronic back pain      History reviewed. No pertinent surgical history.   Family History   Problem Relation Age of Onset    Heart Failure Mother     Diabetes Father      Outpatient Medications Marked as Taking for the 11/19/21 encounter (Office Visit) with Marcus Lockwood MD   Medication Sig Dispense Refill    ketorolac (TORADOL) 10 MG tablet Take 1 tablet by mouth every 6 hours as needed for Pain 20 tablet 0    ondansetron (ZOFRAN) 4 MG tablet Take 1 tablet by mouth every 8 hours as needed for Nausea 20 tablet 0        Patient has no known allergies. Social History     Tobacco Use   Smoking Status Never Smoker   Smokeless Tobacco Never Used      (If patient a smoker, smoking cessation counseling offered)   Social History     Substance and Sexual Activity   Alcohol Use Never       REVIEW OF SYSTEMS:  Review of Systems    Physical Exam:    This a 23 y.o. male   Vitals:    11/19/21 0912   BP: 115/79   Pulse: 81   Temp: 97.5 °F (36.4 °C)     Body mass index is 33.23 kg/m². Physical Exam  Constitutional: Patient in no acute distress. Neuro: Alert and oriented to person, place and time. Psych: Mood normal, affect normal  Lungs:Respiratory effort is normal  Cardiovascular: Warm & Pink  Abdomen: Soft, non-tender, non-distendedwith no CVA,  No flank tenderness,  Orhepatosplenomegaly   Lymphatics: No palpable lymphadenopathy. Bladder non-tender and not distended. Musculoskeletal: Normal gait and station  Penis normal and circumcised  Urethral meatus normal  Scrotal exam normal  Testicles normal bilaterally  Epididymis normal bilaterally  Small sperm granuloma noted on right. Assessment and Plan      1. Pain in both testicles    2. History of vasectomy           Plan:        He has some pain with ejaculation. Likely related to muscle strain  recommend motrin   If no improvement in next 6 weeks, will obtain ultrasound and consider pelvic floor PT  Semen analysis ordered, as he wants to make sure nothing has happened with his vas. Prescriptions Ordered:  No orders of the defined types were placed in this encounter.      Orders Placed:  Orders Placed This Encounter   Procedures    Semen analysis     Obtain specimen after abstaining from sexual intercourse or masturbation 72 hour prior to submitting specimen     Standing Status:   Future     Standing Expiration Date:   11/19/2022             Lu Hinojosa MD    Agree with the ROS entered by the MA.

## 2021-11-19 NOTE — PROGRESS NOTES
Review of Systems   Constitutional: Negative for chills, fatigue and fever. Eyes: Negative for pain, redness and visual disturbance. Respiratory: Negative for cough, shortness of breath and wheezing. Cardiovascular: Negative for chest pain and leg swelling. Gastrointestinal: Negative for abdominal pain, constipation, diarrhea, nausea and vomiting. Genitourinary: Positive for scrotal swelling (comes and goes ) and testicular pain (comes and goes ). Negative for difficulty urinating, dysuria, flank pain, frequency, hematuria and urgency. Musculoskeletal: Positive for back pain. Negative for joint swelling and myalgias. Skin: Negative for rash and wound. Neurological: Negative for dizziness, tremors and numbness. Hematological: Does not bruise/bleed easily.

## 2022-01-05 ENCOUNTER — TELEPHONE (OUTPATIENT)
Dept: UROLOGY | Age: 27
End: 2022-01-05

## 2022-01-13 ENCOUNTER — HOSPITAL ENCOUNTER (OUTPATIENT)
Age: 27
Setting detail: SPECIMEN
Discharge: HOME | End: 2022-01-13

## 2022-01-13 DIAGNOSIS — Z98.52 HISTORY OF VASECTOMY: ICD-10-CM

## 2022-01-13 LAB
AZOOSPERMATIC CONFIRMATION: NORMAL
SEMEN VOLUME: 5 ML
SPERM CT, SMN: NORMAL MM3
SPERM MOTILITY, 2 HR: NORMAL

## 2022-01-20 ENCOUNTER — OFFICE VISIT (OUTPATIENT)
Dept: ORTHOPEDIC SURGERY | Age: 27
End: 2022-01-20
Payer: OTHER GOVERNMENT

## 2022-01-20 VITALS — BODY MASS INDEX: 33.33 KG/M2 | HEIGHT: 69 IN | RESPIRATION RATE: 12 BRPM | WEIGHT: 225 LBS

## 2022-01-20 DIAGNOSIS — M54.9 BACK PAIN, UNSPECIFIED BACK LOCATION, UNSPECIFIED BACK PAIN LATERALITY, UNSPECIFIED CHRONICITY: Primary | ICD-10-CM

## 2022-01-20 PROCEDURE — 99203 OFFICE O/P NEW LOW 30 MIN: CPT | Performed by: PHYSICIAN ASSISTANT

## 2022-01-20 RX ORDER — CYCLOBENZAPRINE HCL 10 MG
10 TABLET ORAL NIGHTLY PRN
Qty: 30 TABLET | Refills: 0 | Status: SHIPPED | OUTPATIENT
Start: 2022-01-20 | End: 2022-02-19

## 2022-01-20 NOTE — LETTER
[facility]  [address] [facility]  [address] [address]  Phone: [phone]  Fax: [phone]    Verena Prince, [address]        January 20, 2022     Patient: Moriah Sneed   YOB: 1995   Date of Visit: 1/20/2022       To Whom It May Concern: It is my medical opinion that Moriah Sneed should be permitted to use a standing desk due to his chronic back pain. If you have any questions or concerns, please don't hesitate to call.     Sincerely,        ANGELA Olson

## 2022-01-20 NOTE — PROGRESS NOTES
[place], [place]              [address] [facility]           Alex Arenas          Dept Phone: [phone]           Dept Fax:  [phone]      Patient ID: Taya Crain is a 23 y.o. male    Chief Compliant:  Chief Complaint   Patient presents with    Back Pain        HPI:  This is a 23 y.o. male who presents to the clinic today for evaluation of lower back pain. Pain started:  Patient presents complaining of lower back pain. 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 Patient is now back in town and is a  in [address] [place] [address] and presents for evaluation to get a note for a standing desk at work. He denies any recent pain, he states that most of his pain is muscle spasms in the right lumbar spine, he is already had an extensive work-up by [place] [facility] including MRI, physical therapy in the past.  He was also informed about epidural injections as a possibility for the future. Patient states that at this point he is not in much pain and is just requiring a note for his work    Sciatic symptoms?: no    .     Review of Systems     Denies chest pain  Denies n/v/d/c and abdominal pain  Denies fevers/chills  C/o back pain    All other systems reviewed and are negative.       Past History:    Current Outpatient Medications:     cyclobenzaprine (FLEXERIL) 10 MG tablet, Take 1 tablet by mouth nightly as needed for Muscle spasms, Disp: 30 tablet, Rfl: 0    ketorolac (TORADOL) 10 MG tablet, Take 1 tablet by mouth every 6 hours as needed for Pain, Disp: 20 tablet, Rfl: 0    ondansetron (ZOFRAN) 4 MG tablet, Take 1 tablet by mouth every 8 hours as needed for Nausea, Disp: 20 tablet, Rfl: 0  No Known Allergies  Social History     Socioeconomic History    Marital status: Unknown Spouse name: Not on file    Number of children: Not on file    Years of education: Not on file    Highest education level: Not on file   Occupational History    Not on file   Tobacco Use    Smoking status: Never Smoker    Smokeless tobacco: Never Used   Vaping Use    Vaping Use: Never used   Substance and Sexual Activity    Alcohol use: Never    Drug use: Never    Sexual activity: Not on file   Other Topics Concern    Not on file   Social History Narrative    Not on file     Social Determinants of Health     Financial Resource Strain: Low Risk     Difficulty of Paying Living Expenses: Not hard at all   Food Insecurity: No Food Insecurity    Worried About Running Out of Food in the Last Year: Never true    [address] in the Last Year: Never true   Transportation Needs:     Lack of Transportation (Medical): Not on file    Lack of Transportation (Non-Medical): Not on file   Physical Activity:     Days of Exercise per Week: Not on file    Minutes of Exercise per Session: Not on file   Stress:     Feeling of Stress : Not on file   Social Connections:     Frequency of Communication with Friends and Family: Not on file    Frequency of Social Gatherings with Friends and Family: Not on file    Attends [employer] Services: Not on file    Active Member of [address] [place] [address] [employer] or Organizations: Not on file    Attends Club or Organization Meetings: Not on file    Marital Status: Not on file   Intimate Partner Violence:     Fear of Current or Ex-Partner: Not on file    Emotionally Abused: Not on file    Physically Abused: Not on file    Sexually Abused: Not on file   Housing Stability:     Unable to Pay for Housing in the Last Year: Not on file    Number of [place] in the Last Year: Not on file    Unstable Housing in the Last Year: Not on file     Past Medical History:   Diagnosis Date    Chronic back pain      No past surgical history on file.   Family History   Problem Relation Age of Onset    Heart Failure Mother     Diabetes Father         Physical Exam:  Vitals signs and nursing note reviewed. Appearance: well-developed, no distress. Head: Normocephalic and atraumatic. Nose: Nose normal.   Conjunctiva/sclera: Conjunctivae normal.        Musculoskeletal:        Lumbar Spine:  Gait: normal  Tenderness: right lumbar, mild spasm  Edema: none     Back ROM:   Flexion: normal  Extension: normal  Lateral Rotation: normal  Lateral Bending: normal    Sensation: normal    Motor:  L quadriceps 5/5; R quadriceps 5/5  L Dorsiflexion 5/5; R dorsiflexion 5/5  L Plantarflexion 5/5; R plantarflexion 5/5    Reflexes  L Patellar 2+/4: R Patellar 2+/4  L Achilles 2+/4; R Achilles 2+/4      Lungs: effort is normal. No respiratory distress. Skin: warm and dry. Mental Status: Alert and oriented to person, place, and time. Sensory: No sensory deficit. Behavior: Behavior normal.      Thought Content: Thought content normal.        Diagnostic imagin view lumbar x-ray obtained in the office shows very mild degenerative changes lower lumbar spine, there is a small thoracal lumbar scoliosis very mild less than 5 degrees. On lateral view, there appears to be a spondylolisthesis very minimal at general lumbar spine, no significant disc collapse      Assessment and Plan:  1. Back pain, unspecified back location, unspecified back pain laterality, unspecified chronicity        Note for work was given for standing desk. Patient to follow-up on a as needed basis. We will send Flexeril to pharmacy as this has helped him in the past.    Macho Humphreys was seen today for back pain. Diagnoses and all orders for this visit:    Back pain, unspecified back location, unspecified back pain laterality, unspecified chronicity  -     XR LUMBAR SPINE (2-3 VIEWS); Future    Other orders  -     cyclobenzaprine (FLEXERIL) 10 MG tablet;  Take 1 tablet by mouth nightly as needed for Muscle spasms    Return if symptoms worsen or fail to improve. Provider Attestation:  Meche Willard, personally performed the services described in this documentation. All medical record entries made by the scribe were at my direction and in my presence. I have reviewed the chart and discharge instructions and agree that the records reflect my personal performance and is accurate and complete. Junior Curtis PA-C 1/20/22     Please note that this chart was generated using voice recognition Dragon dictation software. Although every effort was made to ensure the accuracy of this automated transcription, some errors in transcription may have occurred.

## 2022-02-01 ENCOUNTER — OFFICE VISIT (OUTPATIENT)
Dept: UROLOGY | Age: 27
End: 2022-02-01
Payer: OTHER GOVERNMENT

## 2022-02-01 VITALS
DIASTOLIC BLOOD PRESSURE: 78 MMHG | HEIGHT: 69 IN | SYSTOLIC BLOOD PRESSURE: 132 MMHG | WEIGHT: 225 LBS | BODY MASS INDEX: 33.33 KG/M2 | TEMPERATURE: 97.5 F

## 2022-02-01 DIAGNOSIS — Z98.52 HISTORY OF VASECTOMY: ICD-10-CM

## 2022-02-01 DIAGNOSIS — R10.31 RIGHT GROIN PAIN: Primary | ICD-10-CM

## 2022-02-01 PROCEDURE — 99213 OFFICE O/P EST LOW 20 MIN: CPT | Performed by: UROLOGY

## 2022-02-01 ASSESSMENT — ENCOUNTER SYMPTOMS
ABDOMINAL PAIN: 0
EYE PAIN: 0
NAUSEA: 0
WHEEZING: 0
SHORTNESS OF BREATH: 0
VOMITING: 0
EYE REDNESS: 0
BACK PAIN: 1
CONSTIPATION: 0
COUGH: 0
DIARRHEA: 0

## 2022-02-01 NOTE — PROGRESS NOTES
Review of Systems   Constitutional: Negative for chills, fatigue and fever. Eyes: Negative for pain, redness and visual disturbance. Respiratory: Negative for cough, shortness of breath and wheezing. Cardiovascular: Negative for chest pain and leg swelling. Gastrointestinal: Negative for abdominal pain, constipation, diarrhea, nausea and vomiting. Genitourinary: Negative for difficulty urinating, dysuria, flank pain, frequency, hematuria and urgency. Musculoskeletal: Positive for back pain. Negative for joint swelling and myalgias. Skin: Negative for rash and wound. Neurological: Negative for dizziness, tremors and numbness. Hematological: Does not bruise/bleed easily. Review of Systems PT has Catheter   Constitutional: Negative for chills, fatigue and fever. Eyes: Negative for pain, redness and visual disturbance. Respiratory: Negative for cough, shortness of breath and wheezing. Cardiovascular: Negative for chest pain and leg swelling. Gastrointestinal: Negative for abdominal pain, constipation, diarrhea, nausea and vomiting. Genitourinary: Negative for difficulty urinating, dysuria, flank pain, frequency, hematuria, scrotal swelling, testicular pain and urgency. Musculoskeletal: Negative for back pain, joint swelling and myalgias. Skin: Negative for rash and wound. Neurological: Negative for dizziness, tremors and numbness. Hematological: Does not bruise/bleed easily.

## 2022-02-01 NOTE — PROGRESS NOTES
[address] [facility] [address]  Dept: [address] [facility] Urology Office Note - Established    Patient:  Arjun Kwon  YOB: 1995  Date: 2/1/2022    The patient is a 23 y.o. male who presents todayfor evaluation of the following problems:   Chief Complaint   Patient presents with    Follow-up     6 week f/u    Results     labs       HPI  He is here in follow up post vasectomy. He had the surgery in [place]. His post vas SA showed no sperm. He continues to have rare pain in the right groin with ejaculation. He has low back issues as well. Summary of old records: N/A    Additional History: N/A    Procedures Today: N/A    Urinalysis today:  No results found for this visit on 02/01/22. Last several PSA's:  No results found for: PSA  Last total testosterone:  No results found for: TESTOSTERONE    AUA Symptom Score (2/1/2022):   INCOMPLETE EMPTYING: How often have you had the sensation of not emptying your bladder?: Not at all  FREQUENCY: How often do you have to urinate less than every two hours?: Less than 1 to 5 times  INTERMITTENCY: How often have you found you stopped and started again several times when you urinated?: Not at all  URGENCY: How often have you found it difficult to postpone urination?: Not at all  WEAK STREAM: How often have you had a weak urinary stream?: Not at all  STRAINING: How often have you had to strain to start  urination?: Not at all  NOCTURIA: How many times did you typically get up at night to uriniate?: NONE  TOTAL I-PSS SCORE[de-identified] 1  How would you feel if you were to spend the rest of your life with your urinary condition?: Pleased    Last BUN and creatinine:  Lab Results   Component Value Date    BUN 18 09/30/2021     Lab Results   Component Value Date    CREATININE 1.33 (H) 09/30/2021       Additional Lab/Culture results: none    Imaging Reviewed during this Office Visit: none  (results were independently reviewed by physician and radiology report verified)    PAST MEDICAL, FAMILY AND SOCIAL HISTORY UPDATE:  Past Medical History:   Diagnosis Date    Chronic back pain      No past surgical history on file. Family History   Problem Relation Age of Onset    Heart Failure Mother     Diabetes Father      Outpatient Medications Marked as Taking for the 2/1/22 encounter (Office Visit) with Alexa Ruiz MD   Medication Sig Dispense Refill    cyclobenzaprine (FLEXERIL) 10 MG tablet Take 1 tablet by mouth nightly as needed for Muscle spasms 30 tablet 0    ketorolac (TORADOL) 10 MG tablet Take 1 tablet by mouth every 6 hours as needed for Pain 20 tablet 0    ondansetron (ZOFRAN) 4 MG tablet Take 1 tablet by mouth every 8 hours as needed for Nausea 20 tablet 0       Patient has no known allergies. Social History     Tobacco Use   Smoking Status Never Smoker   Smokeless Tobacco Never Used     (Ifpatient a smoker, smoking cessation counseling offered)    Social History     Substance and Sexual Activity   Alcohol Use Never       REVIEW OF SYSTEMS:  Review of Systems    Physical Exam:      Vitals:    02/01/22 1340   BP: 132/78   Temp: 97.5 °F (36.4 °C)     Body mass index is 33.23 kg/m². Patient is a 23 y.o. male in no acute distress and alert and oriented to person, place and time. Physical Exam  Constitutional: Patient in no acute distress. Neuro: Alert and oriented to person, place and time. Psych: Mood normal, affect normal  Lungs: Respiratory effort is normal  Cardiovascular: Warm & Pink  Abdomen: Soft, non-tender, non-distended with no CVA,  No flank tenderness,  Or hepatosplenomegaly   Lymphatics: No palpablelymphadenopathy. Bladder non-tender and not distended. Musculoskeletal: Normal gait and station      Assessment and Plan      1. Right groin pain    2. History of vasectomy           Plan:          He is here for groin pain.    He has chronic low back issues. Pain is likely MSK in nature  No  intervention at this time. Post vas SA was negative. Return if symptoms worsen or fail to improve. Prescriptions Ordered:  No orders of the defined types were placed in this encounter. Orders Placed:  No orders of the defined types were placed in this encounter. Alexa Ruiz MD    Agree with the ROS entered by the MA.

## 2022-03-04 ENCOUNTER — TELEPHONE (OUTPATIENT)
Dept: PRIMARY CARE CLINIC | Age: 27
End: 2022-03-04

## 2022-03-04 ENCOUNTER — OFFICE VISIT (OUTPATIENT)
Dept: PRIMARY CARE CLINIC | Age: 27
End: 2022-03-04
Payer: OTHER GOVERNMENT

## 2022-03-04 VITALS
SYSTOLIC BLOOD PRESSURE: 122 MMHG | HEIGHT: 69 IN | DIASTOLIC BLOOD PRESSURE: 82 MMHG | WEIGHT: 222.2 LBS | OXYGEN SATURATION: 97 % | BODY MASS INDEX: 32.91 KG/M2 | HEART RATE: 76 BPM

## 2022-03-04 DIAGNOSIS — S63.592D: ICD-10-CM

## 2022-03-04 DIAGNOSIS — M77.8 LEFT WRIST TENDONITIS: Primary | ICD-10-CM

## 2022-03-04 PROCEDURE — 99212 OFFICE O/P EST SF 10 MIN: CPT | Performed by: FAMILY MEDICINE

## 2022-03-04 NOTE — PATIENT INSTRUCTIONS
Patient Education        Wrist Tendinitis: Exercises  Introduction  Here are some examples of exercises for you to try. The exercises may be suggested for a condition or for rehabilitation. Start each exercise slowly. Ease off the exercises if you start to have pain. You will be told when to start these exercises and which ones will work best for you. How to do the exercises  Wrist flexion and extension    1. Place your forearm on a table, with your hand and affected wrist extended beyond the table, palm down. 2. Bend your wrist to move your hand upward and allow your hand to close into a fist, then lower your hand and allow your fingers to relax. Hold each position for about 6 seconds. 3. Repeat 8 to 12 times. Hand flips    1. While seated, place your forearm and affected wrist on your thigh, palm down. 2. Flip your hand over so the back of your hand rests on your thigh and your palm is up. Alternate between palm up and palm down while keeping your forearm on your thigh. 3. Repeat 8 to 12 times. Wrist radial and ulnar deviation    1. Hold your affected hand out in front of you, palm down. 2. Slowly bend your wrist as far as you can from side to side. Hold each position for about 6 seconds. 3. Repeat 8 to 12 times. Wrist extensor stretch    1. Extend the arm with the affected wrist in front of you and point your fingers toward the floor. 2. With your other hand, gently bend your wrist farther until you feel a mild to moderate stretch in your forearm. 3. Hold the stretch for at least 15 to 30 seconds. 4. Repeat 2 to 4 times. 5. When you can do this stretch with ease and no pain, repeat steps 1 through 4. But this time extend your affected arm in front of you and make a fist with your palm facing down. Then bend your wrist, pointing your fist toward the floor. Wrist flexor stretch    1. Extend the arm with the affected wrist in front of you with your palm facing away from your body.   2. Bend back your wrist, pointing your hand up toward the ceiling. 3. With your other hand, gently bend your wrist farther until you feel a mild to moderate stretch in your forearm. 4. Hold the stretch for at least 15 to 30 seconds. 5. Repeat 2 to 4 times. 6. Repeat steps 1 through 5, but this time extend your affected arm in front of you with your palm facing up. Then bend back your wrist, pointing your hand toward the floor. Follow-up care is a key part of your treatment and safety. Be sure to make and go to all appointments, and call your doctor if you are having problems. It's also a good idea to know your test results and keep a list of the medicines you take. Where can you learn more? Go to [email or web address]. org and sign in to your [facility] account. Enter M585 in the RoomReveal box to learn more about \"Wrist Tendinitis: Exercises. \"     If you do not have an account, please click on the \"Sign Up Now\" link. Current as of: July 1, 2021               Content Version: 13.1  © 2031-7839 Healthwise, Incorporated. Care instructions adapted under license by [facility] ([facility]). If you have questions about a medical condition or this instruction, always ask your healthcare professional. [address] any warranty or liability for your use of this information.

## 2022-03-04 NOTE — PROGRESS NOTES
Behavior: Behavior normal.         Thought Content: Thought content normal.         Assessment:       Diagnosis Orders   1. Left wrist tendonitis  [facility] Physical Therapy - [place]    diclofenac sodium (VOLTAREN) 1 % GEL   2. Sprain of ulnar collateral ligament of left wrist, subsequent encounter  [facility] [place]    diclofenac sodium (VOLTAREN) 1 % GEL        Plan:      No follow-ups on file. Do some home stretches and exercises. Start physical therapy. Try Voltaren gel. Call if not improving in 6 weeks  Orders Placed This Encounter   Procedures   [facility] Physical Therapy - [place]     Referral Priority:   Routine     Referral Type:   Eval and Treat     Referral Reason:   Specialty Services Required     Requested Specialty:   Physical Therapy     Number of Visits Requested:   1     Orders Placed This Encounter   Medications    diclofenac sodium (VOLTAREN) 1 % GEL     Sig: Apply 2 g topically 4 times daily     Dispense:  100 g     Refill:  1      Reviewed medications and possibleside effects.        Electronically signed by Hossein Ca MD on 3/4/2022 at 10:15 AM

## 2022-03-16 ENCOUNTER — HOSPITAL ENCOUNTER (OUTPATIENT)
Dept: PHYSICAL THERAPY | Facility: CLINIC | Age: 27
Setting detail: THERAPIES SERIES
Discharge: HOME | End: 2022-03-16
Payer: OTHER GOVERNMENT

## 2022-03-16 PROCEDURE — 97110 THERAPEUTIC EXERCISES: CPT

## 2022-03-16 PROCEDURE — 97161 PT EVAL LOW COMPLEX 20 MIN: CPT

## 2022-03-16 NOTE — CONSULTS
[] [facility]) - [facility] &  Therapy  [address]  [phone]  F: [phone] [] [address]  [address]   [address]  P: [phone]  F: [phone] [x] [address] &  Therapy  [address]  P: [phone]  F: [phone] [] [address]  P: [phone]  F: [phone] [] [address]  [facility]   [address]   [place]: [phone]  F: [phone]      Physical Therapy Upper Extremity Evaluation    Date:  3/16/2022  Patient: Hammad Ferrer   : 1995  MRN: [id number]  Physician: Seymour Ochoa MD    Insurance: [employer] Millard (Visits N)   Medical Diagnosis:   M77.8 (ICD-10-CM) - Left wrist tendonitis   S63.592D (ICD-10-CM) - Sprain of ulnar collateral ligament of left wrist, subsequent encounter     Rehab Codes: M25.532, M25.632, M62.81  Onset Date: 22   Next 's appt: PRN    Subjective:   CC/HPI: Patient reports to physical therapy with (L) wrist pain. Patient reports that about 4 weeks ago he began to experience increased (L) wrist pain that came on insidiously. Patient states that he began to strength train more and states it may be due to that, however states he does not feel he is lifting too heavy of weight. Patient states that he has the most difficulty with gripping, lifting and supination. States that he saw his PCP on 3/4/22, where he was referred to physical therapy and told to use arthritis cream in order to decrease pain. Patient states that he has not tried arthritis cream at this time. States he has stopped weight training at the gym at this time and has difficulty care taking for children due to wrist pain.       PMHx: [x] Unremarkable [] Diabetes [] HTN  [] Pacemaker   [] MI/Heart Problems    [] Cancer [] Arthritis [] Other:              [x] Refer to full medical chart  In EPIC       Comorbidities:   [] Obesity [] Dialysis  [] N/A   [] Asthma/COPD [] Dementia [] Other:    [] Stroke [] Sleep apnea [] Other:   [] Vascular disease [] Rheumatic disease [] Other:     Tests: [] X-Ray: [] MRI:  [] Other:      Medications: [x] Refer to full medical record [] None [] Other:  Allergies:      [x] Refer to full medical record [] None [] Other:    Function:  Hand Dominance  [] Right  [] Left  Employer  [employer]   Job Status [x]  Normal duty   [] Light duty   [] Off due to condition    []  Retired   [] Not employed   [] Disability  [] Other:  []  Return to work:    Work activities/duties        ADL/IADL Previous level of function Current level of function Who currently assists the patient with task   Bathing  [x] Independent  [] Assist [x] Independent  [] Assist    Dress/grooming [x] Independent  [] Assist [x] Independent  [] Assist    Transfer/mobility [x] Independent  [] Assist [x] Independent  [] Assist    Feeding [x] Independent  [] Assist [x] Independent  [] Assist    Toileting [x] Independent  [] Assist [x] Independent  [] Assist    Driving [x] Independent  [] Assist [x] Independent  [] Assist Difficulty gripping wheel    Housekeeping [x] Independent  [] Assist [x] Independent  [] Assist Difficulty gripping vacuum    Grocery shop/meal prep [x] Independent  [] Assist [x] Independent  [] Assist Difficulty carrying grocery bags        Pain:  [x] Yes  [] No Location: (L) wrist Pain Rating: (0-10 scale) 2/10 at rest, 7/10 with gripping  Pain altered Tx:  [] Yes  [x] No  Action:    Symptoms:  [] Improving [x] Worsening [] Same  Better:  [] AM    [] PM    [] Sit    [] Rise/Sit    []Stand    [] Walk    [] Lying    [x] Other: Rest  Worse: [] AM    [] PM    [] Sit    [] Rise/Sit    []Stand    [] Walk    [] Lying    [] Bend                      [] Valsalva    [x] Other: Gripping, lifting, supination  Sleep: [x] OK    [] Disturbed    Objective:     ROM  °A/P END FEEL STRENGTH TESTS (+/-) Left Right Not Tested    Left Right  Left Right Drop Arm   [x]   Shoulder Flex [facility]    Speeds   [x]   Ext      Neer   [x]   ABD [facility]    Munoz    [x]   ER @ 0 45 90      Painful Arc   [x]   IR      Tinel   [x]   Elbow Flex. 140 140  5 5       Elbow Ext. 0 0  5 5       Supination 75 70  4 5       Pronation 78 90  5 5       Wrist Flexion 70 75  4- 5       Wrist Extension 50 70  5 5       Ulnar Deviation 40 38  5 5       Radial Deviation 20 20  4 5           80 100           OBSERVATION No Deficit Deficit Not Tested Comments   Forward Head [] [] [x]    Rounded Shoulders [] [] [x]    Kyphosis [] [] [x] Decreased   Scapular Height/Position [] [] [x]    Winging [] [] [x]    Scapulohumeral Rhythm [] [] [x]    INSPECTION/PALPATION       Wrist [] [x] [] TTP at TFCC    Supraspinatus [] [] [x]    Biceps tendon/groove [] [] [x]    Posterior shld [] [] [x]    Subscapularis [] [] [x]    NEUROLOGICAL       Cervical ROM/Quadrant [] [] [x]    Reflexes [] [] [x]    Compression/Distraction [] [] [x]    Sensation [x] [] []      Functional Test: UEFS Score: 33% functionally impaired     Comments:    Assessment:  Patient is a 33year old male who presents to physical therapy with (L) wrist pain. Patient demonstrates impairments in pain tolerance, (L) wrist AROM, (L) elbow AROM,  (L) wrist strength and  strength. These impairments affect the patient's ability to perform ADLs, household related tasks, normal exercise routine and care taking tasks without increased pain. Patient would benefit from skilled physical therapy in order to improve impairments and overall quality of life. Educated patient on evaluation findings and poc. Educated patient on TFCC and anatomy of wrist. Educated patient on importance of stability within the wrist in order to increase ease of performing lifting tasks. Patient verbalizes good understanding of education at this time. Problems:    [x] ? Pain:  [x] ? ROM:  [x] ? Strength:  [x] ? Function:  [] Other:      STG: (to be met in 6 treatments)  1. ? Pain: Patient will report pain as 0/10 at rest  2. ? ROM: Patient will improve (B) wrist AROM to equal in order to increase ease of performing ADLs  3. Patient will improve (B) wrist strength to 4+/5 in order to increase ease of performing care taking tasks, such as lifting children  4. Patient to be independent with home exercise program as demonstrated by performance with correct form without cues. LTG: (to be met in 12 treatments)  1. Patient will report UEFS as 23% impaired in order to indicate improved overall quality of life   2. Patient will improve (L)  strength to 100 lbs in order to assist patient back into strength training at the gym  3. Patient will report being able to complete 10 pushups without pain in order to assist patient back into normal exercise routine and work activities   4. Patient will report 2/10 pain at the worst                   Patient goals: \"Strengthen area to full recovery\"    Rehab Potential:  [x] Good  [] Fair  [] Poor   Suggested Professional Referral:  [x] No  [] Yes:  Barriers to Goal Achievement:  [x] No  [] Yes:  Domestic Concerns:  [x] No  [] Yes:    Pt. Education:  [x] Plans/Goals, Risks/Benefits discussed  [x] Home exercise program  Access Code: [email or web address]  URL: [email or web address]  Date: 03/16/2022  Prepared by: Annabel Robertson    Exercises  Standing Wrist Flexion Stretch - 2 x daily - 7 x weekly - 3 sets - 30 sec hold  Standing Wrist Extension Stretch - 2 x daily - 7 x weekly - 3 sets - 30 sec hold  Wrist Flexion AROM - 2 x daily - 7 x weekly - 3 sets - 10 reps  Wrist Extension AROM - 2 x daily - 7 x weekly - 3 sets - 10 reps  Wrist Radial Ulnar Deviation AROM - 2 x daily - 7 x weekly - 3 sets - 10 reps  Seated Forearm Pronation and Supination AROM - 2 x daily - 7 x weekly - 3 sets - 10 reps  Putty Squeezes - 1 x daily - 7 x weekly - 1 sets - 2 min hold      Method of Education: [x] Verbal  [x] Demo  [x] Written  Comprehension of Education:  [x] Verbalizes understanding. [x] Demonstrates understanding. [x] Needs Review. [] Demonstrates/verbalizes understanding of HEP/Ed previously given. Treatment Plan:  [x] Therapeutic Exercise   40102  [] Iontophoresis: 4 mg/mL Dexamethasone Sodium Phosphate  mAmin  98922   [x] Therapeutic Activity  59839 [x] Vasopneumatic cold with compression  70783    [] Gait Training   04682 [] Ultrasound   22774   [] Neuromuscular Re-education  23638 [] Electrical Stimulation Unattended  91997   [x] Manual Therapy  70086 [] Electrical Stimulation Attended  85552   [x] Instruction in HEP  [] Lumbar/Cervical Traction  22307   [] Aquatic Therapy   99598 [x] Cold/hotpack    [] Massage   00455      [] Dry Needling, 1 or 2 muscles  32682   [] Biofeedback, first 15 minutes   09778  [] Biofeedback, additional 15 minutes   98284 [] Dry Needling, 3 or more muscles  48353     [x]  Medication allergies reviewed for use of    Dexamethasone Sodium Phosphate 4mg/ml     with iontophoresis treatments. Pt is not allergic.        Frequency: 2 x/week for 12 visits    Todays Treatment:  Modalities:   Precautions:  Exercises:  Exercise Reps/ Time Weight/ Level Comments   UBE            Wrist flexion S 30\"x3     Wrist ext S 30\"x3           Wrist Flex/Ext AROM x20     Wrist Ulnar/Radial Dev x20     Supination/Pronation x20           Putty Squeeze 2' Green    Other:    Specific Instructions for next treatment: Progress wrist mobility, wrist strength and  strength      Evaluation Complexity:  History (Personal factors, comorbidities) [x] 0 [] 1-2 [] 3+   Exam (limitations, restrictions) [x] 1-2 [] 3 [] 4+   Clinical presentation (progression) [x] Stable [] Evolving  [] Unstable   Decision Making [x] Low [] Moderate [] High    [x] Low Complexity [] Moderate Complexity [] High Complexity       Treatment Charges: Mins Units   [x] Evaluation       [x]  Low       []  Moderate       []  High 30 1   []  Modalities     [x]  Ther Exercise 15 1   []  Manual Therapy     []  Ther Activities     []  Aquatics     []  Vasocompression     []  Other       TOTAL TREATMENT TIME: 45 min    Time in: 1:06 pm    Time Out: 1:51 pm    Electronically signed by: Shelly Friedman PT        Physician Signature:________________________________Date:__________________  By signing above or cosigning this note, I have reviewed this plan of care and certify a need for medically necessary rehabilitation services.      *PLEASE SIGN ABOVE AND FAX BACK ALL PAGES*

## 2022-03-22 ENCOUNTER — HOSPITAL ENCOUNTER (OUTPATIENT)
Dept: PHYSICAL THERAPY | Facility: CLINIC | Age: 27
Setting detail: THERAPIES SERIES
Discharge: HOME | End: 2022-03-22
Payer: OTHER GOVERNMENT

## 2022-03-22 ENCOUNTER — OFFICE VISIT (OUTPATIENT)
Dept: PRIMARY CARE CLINIC | Age: 27
End: 2022-03-22
Payer: OTHER GOVERNMENT

## 2022-03-22 VITALS
BODY MASS INDEX: 32.97 KG/M2 | HEART RATE: 92 BPM | DIASTOLIC BLOOD PRESSURE: 82 MMHG | OXYGEN SATURATION: 98 % | WEIGHT: 222.6 LBS | SYSTOLIC BLOOD PRESSURE: 134 MMHG | HEIGHT: 69 IN

## 2022-03-22 DIAGNOSIS — M54.50 LUMBAR BACK PAIN: Primary | Chronic | ICD-10-CM

## 2022-03-22 PROCEDURE — 97110 THERAPEUTIC EXERCISES: CPT

## 2022-03-22 PROCEDURE — 99213 OFFICE O/P EST LOW 20 MIN: CPT | Performed by: FAMILY MEDICINE

## 2022-03-22 ASSESSMENT — ENCOUNTER SYMPTOMS: BACK PAIN: 1

## 2022-03-22 NOTE — PROGRESS NOTES
[facility] PRIMARY CARE  [address] [place] [address]  Dept: Farrukh Phelps is a 33 y.o. male Established patient, who presents today for his medical conditions/complaintsas noted below. Chief Complaint   Patient presents with    Forms     Pt here today to update his [employer] profile       HPI:     HPI  Hx of back pain and shin issues. Hx of chronic medial tibia pain  Had PT in past for both. Has been worked up in the past at the [facility]. He knows the back stretches he supposed to be doing. He cannot run 2 miles due to his back pain. He can do the alternative cardio. Reviewed prior notes urology and Orthopedics  Reviewed previous Imaging    LDL Cholesterol (mg/dL)   Date Value   04/22/2021 114       (goal LDL is <100)   AST (U/L)   Date Value   09/30/2021 19     ALT (U/L)   Date Value   09/30/2021 21     BUN (mg/dL)   Date Value   09/30/2021 18     BP Readings from Last 3 Encounters:   03/22/22 134/82   03/04/22 122/82   02/01/22 132/78          (goal 120/80)    Past Medical History:   Diagnosis Date    Chronic back pain       History reviewed. No pertinent surgical history. Family History   Problem Relation Age of Onset    Heart Failure Mother     Diabetes Father        Social History     Tobacco Use    Smoking status: Never Smoker    Smokeless tobacco: Never Used   Substance Use Topics    Alcohol use: Never      Current Outpatient Medications   Medication Sig Dispense Refill    diclofenac sodium (VOLTAREN) 1 % GEL Apply 2 g topically 4 times daily 100 g 1     No current facility-administered medications for this visit.      No Known Allergies    Health Maintenance   Topic Date Due    Hepatitis C screen  Never done    Varicella vaccine (1 of 2 - 2-dose childhood series) Never done    HPV vaccine (1 - Male 2-dose series) Never done    HIV screen  Never done    DTaP/Tdap/Td vaccine (1 - Tdap) Never done    COVID-19 Vaccine (3 - Booster for Pfizer series) 11/06/2021    Depression Screen  09/02/2022    Flu vaccine  Completed    Hepatitis A vaccine  Aged Out    Hepatitis B vaccine  Aged Out    Hib vaccine  Aged Out    Meningococcal (ACWY) vaccine  Aged Out    Pneumococcal 0-64 years Vaccine  Aged Out       Subjective:      Review of Systems   Musculoskeletal: Positive for back pain. Objective:     /82   Pulse 92   Ht 5' 9\" (1.753 m)   Wt 222 lb 9.6 oz (101 kg)   SpO2 98%   BMI 32.87 kg/m²   Physical Exam  Vitals and nursing note reviewed. Constitutional:       Appearance: Normal appearance. HENT:      Head: Normocephalic and atraumatic. Pulmonary:      Effort: No respiratory distress. Musculoskeletal:      Comments: Lumbar range of motion forward flexion 80 to 90 degrees, extension is 10 to 20 degrees, side to side motion is slightly decreased  Lumbar spine and paraspinals are not tender to palpation. Skin:     General: Skin is warm. Neurological:      Mental Status: He is alert and oriented to person, place, and time. Deep Tendon Reflexes:      Reflex Scores:       Patellar reflexes are 2+ on the right side and 2+ on the left side. Achilles reflexes are 1+ on the right side and 1+ on the left side. Comments: Toes dorsiflexion strength 5/5 bilaterally, knee extension strength 5/5 bilaterally. Gait is normal.   Psychiatric:         Mood and Affect: Mood normal.         Behavior: Behavior normal.         Thought Content: Thought content normal.         Assessment:       Diagnosis Orders   1. Lumbar back pain      worked up in [facility], ortho eval and xray done 2022        Plan:      No follow-ups on file. Reviewed his forms for the [employer]. He can do out all activities except for the 2 mile run  No orders of the defined types were placed in this encounter. No orders of the defined types were placed in this encounter.       Patient given educationalmaterials - see patient instructions. Discussed use, benefit, and side effectsof prescribed medications. All patient questions answered. Pt voiced understanding. Reviewed health maintenance. Instructed to continue current medications, diet and keep up with exercise. Patient agreed with treatment plan. Follow up as directed.      Electronicallysigned by Lyndon Moody MD on 3/22/2022 at 9:56 AM

## 2022-03-22 NOTE — FLOWSHEET NOTE
[x] [address]    Outpatient Rehabilitation &  Therapy  [address] [place] [address]  P: [phone]  F: [phone]      Physical Therapy Daily Treatment Note    Date:  3/22/2022  Patient: Chevy Leach                : 1995                      MRN: [id number]  Physician: Radha Dave MD                         Insurance: [address] (Visits BMN)   Medical Diagnosis:   M77.8 (ICD-10-CM) - Left wrist tendonitis   S63.592D (ICD-10-CM) - Sprain of ulnar collateral ligament of left wrist, subsequent encounter      Rehab Codes: M25.532, M25.632, M62.81  Onset Date: 22               Next 's appt: PRN  Visit# / total visits:      Cancels/No Shows:     Subjective: pt arrived reporting no pain just stiffness in L wrist. Notes after eval wrist felt looser. Pain:  [] Yes  [x] No Location: L wrist Pain Rating: (0-10 scale) 0/10  Pain altered Tx:  [x] No  [] Yes  Action:  Comments:    Todays Treatment:  Modalities:   Precautions:  Exercises:  Exercise Reps/ Time Weight/ Level Comments   UBE 6' L9                Wrist flexion S 3x30\"       Wrist ext S 3x30\"                 Wrist Flex/Ext AROM x20       Wrist Ulnar/Radial Dev AROM x20       Supination/Pronation x20 1#           digiflex 20x red    Web gripper 20x yellow    Opposition 10xea Yellow clip    Purple gripper 15x 1 Red band    Wrist maze  3x       Blue gripper  20x     Putty Squeeze 2' Green     Other:     Specific Instructions for next treatment: Progress wrist mobility, wrist strength and  strength      Instructions for subsequent visits:      Treatment Charges: Mins Units   []  Modalities     [x]  Ther Exercise 38 3   []  Manual Therapy     []  Ther Activities     []  Aquatics     []  Vasocompression     []  Other     Total Treatment time 38 3       Assessment: [x] Progressing toward goals. Initiated treatment on UBE followed by stretches and exercises with good tolerance.  Pt having no pain or soreness with exercises, besides weighted supination. Pt noting only painful if he gets to full range so going slightly less than that is painless. Pt noting wrist felling lest tight post treatment. Continue to progress strength as pain and soreness allow. [] No change. [] Other:      STG: (to be met in 6 treatments)  1. ? Pain: Patient will report pain as 0/10 at rest  2. ? ROM: Patient will improve (B) wrist AROM to equal in order to increase ease of performing ADLs  3. Patient will improve (B) wrist strength to 4+/5 in order to increase ease of performing care taking tasks, such as lifting children  4. Patient to be independent with home exercise program as demonstrated by performance with correct form without cues. LTG: (to be met in 12 treatments)  1. Patient will report UEFS as 23% impaired in order to indicate improved overall quality of life   2. Patient will improve (L)  strength to 100 lbs in order to assist patient back into strength training at the gym  3. Patient will report being able to complete 10 pushups without pain in order to assist patient back into normal exercise routine and work activities   4. Patient will report 2/10 pain at the worst                    Patient goals: \"Strengthen area to full recovery\"    Pt. Education:  [x] Yes  [] No  [x] Reviewed Prior HEP/Ed  Method of Education: [x] Verbal  [x] Demo  [] Written  Comprehension of Education:  [x] Verbalizes understanding. [x] Demonstrates understanding. [] Needs review. [] Demonstrates/verbalizes HEP/Ed previously given. Access Code: [email or web address]  URL: [email or web address]  Date: 03/16/2022  Prepared by: Emily Phillips     Exercises  Standing Wrist Flexion Stretch - 2 x daily - 7 x weekly - 3 sets - 30 sec hold  Standing Wrist Extension Stretch - 2 x daily - 7 x weekly - 3 sets - 30 sec hold  Wrist Flexion AROM - 2 x daily - 7 x weekly - 3 sets - 10 reps  Wrist Extension AROM - 2 x daily - 7 x weekly - 3 sets - 10 reps  Wrist Radial Ulnar Deviation AROM - 2 x daily - 7 x weekly - 3 sets - 10 reps  Seated Forearm Pronation and Supination AROM - 2 x daily - 7 x weekly - 3 sets - 10 reps  Putty Squeezes - 1 x daily - 7 x weekly - 1 sets - 2 min hold    Plan: [x] Continue with current plan of care towards goals.         Time In: 6:59 am            Time Out: 7:40 am    Electronically signed by:  Aden Dugan PTA

## 2022-03-25 ENCOUNTER — HOSPITAL ENCOUNTER (OUTPATIENT)
Dept: PHYSICAL THERAPY | Facility: CLINIC | Age: 27
Setting detail: THERAPIES SERIES
Discharge: HOME | End: 2022-03-25
Payer: OTHER GOVERNMENT

## 2022-03-25 PROCEDURE — 97110 THERAPEUTIC EXERCISES: CPT

## 2022-03-25 NOTE — FLOWSHEET NOTE
[x] [address]    Outpatient Rehabilitation &  Therapy  [address] [place] [address]  P: [phone]  F: [phone]      Physical Therapy Daily Treatment Note    Date:  3/25/2022  Patient: Angelito Aponte                : 1995                      MRN: [id number]  Physician: Nataliya Holly MD                         Insurance: [employer] ([employer])   Medical Diagnosis:   M77.8 (ICD-10-CM) - Left wrist tendonitis   S63.592D (ICD-10-CM) - Sprain of ulnar collateral ligament of left wrist, subsequent encounter      Rehab Codes: M25.532, M25.632, M62.81  Onset Date: 22               Next 's appt: PRN  Visit# / total visits: 3/12     Cancels/No Shows:     Subjective: pt reporting same symptoms just stiffness but no pain. Notes no increase in pain or soreness after last visit.     Pain:  [] Yes  [x] No Location: L wrist Pain Rating: (0-10 scale) 0/10  Pain altered Tx:  [x] No  [] Yes  Action:  Comments:    Todays Treatment:  Modalities:   Precautions:  Exercises:  Exercise Reps/ Time Weight/ Level Comments   UBE 6' L9                Wrist flexion S 3x30\"       Wrist ext S 3x30\"                 Wrist Flex/Ext AROM x20       Wrist Ulnar/Radial Dev AROM x20       Supination/Pronation x20 1#           Wrist flexion/ext 2x10 2#    Wrist ulnar/radial dev 2x10 2#                digiflex 20x red    Web gripper 20x yellow    Opposition 10xea Yellow/red clip red for 1st and 2nd digit, yellow for 3rd and 4th   Purple gripper 20x Red/yellow band 2 red for 1st and 2nd digit, red/yellow for 3rd and 4th   Wrist maze  3x       Blue gripper  30x     Putty Squeeze 2' Green     Wrist roll 5x 2.5#    Other:     Specific Instructions for next treatment: Progress wrist mobility, wrist strength and  strength      Instructions for subsequent visits:      Treatment Charges: Mins Units   []  Modalities     [x]  Ther Exercise 38 3   []  Manual Therapy     []  Ther Activities     []  Aquatics     [] Vasocompression     []  Other     Total Treatment time 38 3       Assessment: [x] Progressing toward goals. Progressed pt with adding in strengthening for wrist flexion/ext/ulnar dev/radial dev, as well as adding in wrist roll with good tolerance. Also able to increase resistance for some opposition and purple gripper this date. Pt noting only having increased pain with weighted wrist flexion, with pt able to have no pain with 1# post treatment. Good muscle fatigue and soreness noted post treatment. Continue to progress as tolerable. [] No change. [] Other:      STG: (to be met in 6 treatments)  1. ? Pain: Patient will report pain as 0/10 at rest  2. ? ROM: Patient will improve (B) wrist AROM to equal in order to increase ease of performing ADLs  3. Patient will improve (B) wrist strength to 4+/5 in order to increase ease of performing care taking tasks, such as lifting children  4. Patient to be independent with home exercise program as demonstrated by performance with correct form without cues. LTG: (to be met in 12 treatments)  1. Patient will report UEFS as 23% impaired in order to indicate improved overall quality of life   2. Patient will improve (L)  strength to 100 lbs in order to assist patient back into strength training at the gym  3. Patient will report being able to complete 10 pushups without pain in order to assist patient back into normal exercise routine and work activities   4. Patient will report 2/10 pain at the worst                    Patient goals: \"Strengthen area to full recovery\"    Pt. Education:  [x] Yes  [] No  [x] Reviewed Prior HEP/Ed  Method of Education: [x] Verbal  [x] Demo  [] Written  Comprehension of Education:  [x] Verbalizes understanding. [x] Demonstrates understanding. [] Needs review. [] Demonstrates/verbalizes HEP/Ed previously given. Access Code: [email or web address]  URL: [email or web address]  Date: 03/16/2022  Prepared by: Timbo Ramirez Wintrow     Exercises  Standing Wrist Flexion Stretch - 2 x daily - 7 x weekly - 3 sets - 30 sec hold  Standing Wrist Extension Stretch - 2 x daily - 7 x weekly - 3 sets - 30 sec hold  Wrist Flexion AROM - 2 x daily - 7 x weekly - 3 sets - 10 reps  Wrist Extension AROM - 2 x daily - 7 x weekly - 3 sets - 10 reps  Wrist Radial Ulnar Deviation AROM - 2 x daily - 7 x weekly - 3 sets - 10 reps  Seated Forearm Pronation and Supination AROM - 2 x daily - 7 x weekly - 3 sets - 10 reps  Putty Squeezes - 1 x daily - 7 x weekly - 1 sets - 2 min hold    Plan: [x] Continue with current plan of care towards goals.         Time In: 3:00 pm            Time Out: 3:45 pm    Electronically signed by:  Jorge Triana PTA

## 2022-03-29 ENCOUNTER — HOSPITAL ENCOUNTER (OUTPATIENT)
Dept: PHYSICAL THERAPY | Facility: CLINIC | Age: 27
Setting detail: THERAPIES SERIES
Discharge: HOME | End: 2022-03-29
Payer: OTHER GOVERNMENT

## 2022-03-29 PROCEDURE — 97110 THERAPEUTIC EXERCISES: CPT

## 2022-03-29 NOTE — FLOWSHEET NOTE
[x] [address]   Outpatient Rehabilitation &  Therapy  [address] [place] [address]  P: [phone]  F: [phone]      Physical Therapy Daily Treatment Note    Date:  3/29/2022  Patient: Esthela Antoine                : 1995                      MRN: [id number]  Physician: Michael Fried MD                         Insurance: [employer] ([employer])   Medical Diagnosis:   M77.8 (ICD-10-CM) - Left wrist tendonitis   S63.592D (ICD-10-CM) - Sprain of ulnar collateral ligament of left wrist, subsequent encounter      Rehab Codes: M25.532, M25.632, M62.81  Onset Date: 22               Next 's appt: PRN  Visit# / total visits:      Cancels/No Shows:     Subjective: pt arrived reporting no pain, and that L wrist tightness is improving.     Pain:  [] Yes  [x] No Location: L wrist Pain Rating: (0-10 scale) 0/10  Pain altered Tx:  [x] No  [] Yes  Action:  Comments:    Todays Treatment:  Modalities:   Precautions:  Exercises:  Exercise Reps/ Time Weight/ Level Comments   UBE 6' L9                Wrist flexion S 3x30\"       Wrist ext S 3x30\"                 Wrist Flex/Ext AROM x30       Wrist Ulnar/Radial Dev AROM x30      Supination/Pronation x30 1#           Wrist extension 3x10 2#    Wrist ulnar/radial dev 3x10 2#    Wrist flexion  3x10 1#          digiflex 30x red    Web gripper 30x yellow    Opposition 15xea Yellow/red clip red for 1st and 2nd digit, yellow for 3rd and 4th   Purple gripper 30x Red/yellow band 2 red for 1st and 2nd digit, red/yellow for 3rd and 4th   Wrist maze  5x       Blue gripper  30x     Putty Squeeze 2' Green     Wrist roll 5x 2.5#    Other:     Specific Instructions for next treatment: Progress wrist mobility, wrist strength and  strength      Instructions for subsequent visits:      Treatment Charges: Mins Units   []  Modalities     [x]  Ther Exercise 40 3   []  Manual Therapy     []  Ther Activities     []  Aquatics     []  Vasocompression     []  Other Total Treatment time 40 3       Assessment: [x] Progressing toward goals. Increased reps for most of exercises with good tolerance. Pt noting no pain with wrist flexion this date. Pt also having muscle fatigue with progressions, but no increase in pain. Plan to continue to progress strengthening and will re-evaluate next week with possible D/C as long as pain and tightness continue to decrease. [] No change. [] Other:      STG: (to be met in 6 treatments)  1. ? Pain: Patient will report pain as 0/10 at rest  2. ? ROM: Patient will improve (B) wrist AROM to equal in order to increase ease of performing ADLs  3. Patient will improve (B) wrist strength to 4+/5 in order to increase ease of performing care taking tasks, such as lifting children  4. Patient to be independent with home exercise program as demonstrated by performance with correct form without cues. LTG: (to be met in 12 treatments)  1. Patient will report UEFS as 23% impaired in order to indicate improved overall quality of life   2. Patient will improve (L)  strength to 100 lbs in order to assist patient back into strength training at the gym  3. Patient will report being able to complete 10 pushups without pain in order to assist patient back into normal exercise routine and work activities   4. Patient will report 2/10 pain at the worst                    Patient goals: \"Strengthen area to full recovery\"    Pt. Education:  [x] Yes  [] No  [x] Reviewed Prior HEP/Ed  Method of Education: [x] Verbal  [x] Demo  [] Written  Comprehension of Education:  [x] Verbalizes understanding. [x] Demonstrates understanding. [] Needs review. [] Demonstrates/verbalizes HEP/Ed previously given. Access Code: [email or web address]  URL: [email or web address]  Date: 03/16/2022  Prepared by: Arelis Saleh     Exercises  Standing Wrist Flexion Stretch - 2 x daily - 7 x weekly - 3 sets - 30 sec hold  Standing Wrist Extension Stretch - 2 x daily - 7 x weekly - 3 sets - 30 sec hold  Wrist Flexion AROM - 2 x daily - 7 x weekly - 3 sets - 10 reps  Wrist Extension AROM - 2 x daily - 7 x weekly - 3 sets - 10 reps  Wrist Radial Ulnar Deviation AROM - 2 x daily - 7 x weekly - 3 sets - 10 reps  Seated Forearm Pronation and Supination AROM - 2 x daily - 7 x weekly - 3 sets - 10 reps  Putty Squeezes - 1 x daily - 7 x weekly - 1 sets - 2 min hold    Plan: [x] Continue with current plan of care towards goals.         Time In: 7:00 am            Time Out: 7:45 am    Electronically signed by:  Dhiraj Puri PTA

## 2022-04-01 ENCOUNTER — HOSPITAL ENCOUNTER (OUTPATIENT)
Dept: PHYSICAL THERAPY | Facility: CLINIC | Age: 27
Setting detail: THERAPIES SERIES
Discharge: HOME | End: 2022-04-01
Payer: OTHER GOVERNMENT

## 2022-04-01 PROCEDURE — 97110 THERAPEUTIC EXERCISES: CPT

## 2022-04-01 NOTE — FLOWSHEET NOTE
[x] [address]  Outpatient Rehabilitation &  Therapy  [address]  P: [phone]  F: [phone]      Physical Therapy Daily Treatment Note    Date:  2022  Patient: Luis Bee                : 1995                      MRN: [id number]  Physician: Tia Gómez MD                         Insurance: [employer] ([employer])   Medical Diagnosis:   M77.8 (ICD-10-CM) - Left wrist tendonitis   S63.592D (ICD-10-CM) - Sprain of ulnar collateral ligament of left wrist, subsequent encounter      Rehab Codes: M25.532, M25.632, M62.81  Onset Date: 22               Next 's appt: PRN  Visit# / total visits:      Cancels/No Shows:     Subjective: pt reporting no pain or tightness in L wrist. States he had some muscle soreness after last visit.    Pain:  [] Yes  [x] No Location: L wrist Pain Rating: (0-10 scale) 0/10  Pain altered Tx:  [x] No  [] Yes  Action:  Comments:    Todays Treatment:  Modalities:   Precautions:  Exercises:  Exercise Reps/ Time Weight/ Level Comments   UBE 6' L9                Wrist flexion S 3x30\"       Wrist ext S 3x30\"                 Wrist Flex/Ext AROM x30       Wrist Ulnar/Radial Dev AROM x30      Supination/Pronation x30 2#           Wrist extension 3x10 2#    Wrist ulnar/radial dev 3x10 2#    Wrist flexion  3x10 2#          digiflex 30x red    Web gripper 30x red    Opposition 15xea Yellow/red clip red for 1st and 2nd digit, yellow for 3rd and 4th   Purple gripper 30x Red/yellow band 2 red for 1st and 2nd digit, red/yellow for 3rd and 4th   Wrist maze  5x       Blue gripper  30x     Putty Squeeze 2' Green     Wrist roll 5x 2.5#    Table pushups  20x     Other:     Specific Instructions for next treatment: closed chain exercises       Treatment Charges: Mins Units   []  Modalities     [x]  Ther Exercise 30 2   []  Manual Therapy     []  Ther Activities     []  Aquatics     []  Vasocompression     []  Other     Total Treatment time 30 2 Assessment: [x] Progressing toward goals. Increased weight for supination, pronation and flexion with good tolerance. Increased web gripper resistance with no issues noted. Added in table pushups to work on closed chain with no issues noted. Pt noting muscle fatigue but no increase in pain. Plan to add in more closed chain next visit and D/C to HEP after that appointment. [] No change. [] Other:      STG: (to be met in 6 treatments)  1. ? Pain: Patient will report pain as 0/10 at rest  2. ? ROM: Patient will improve (B) wrist AROM to equal in order to increase ease of performing ADLs  3. Patient will improve (B) wrist strength to 4+/5 in order to increase ease of performing care taking tasks, such as lifting children  4. Patient to be independent with home exercise program as demonstrated by performance with correct form without cues. LTG: (to be met in 12 treatments)  1. Patient will report UEFS as 23% impaired in order to indicate improved overall quality of life   2. Patient will improve (L)  strength to 100 lbs in order to assist patient back into strength training at the gym  3. Patient will report being able to complete 10 pushups without pain in order to assist patient back into normal exercise routine and work activities   4. Patient will report 2/10 pain at the worst                    Patient goals: \"Strengthen area to full recovery\"    Pt. Education:  [x] Yes  [] No  [x] Reviewed Prior HEP/Ed  Method of Education: [x] Verbal  [x] Demo  [] Written  Comprehension of Education:  [x] Verbalizes understanding. [x] Demonstrates understanding. [] Needs review. [] Demonstrates/verbalizes HEP/Ed previously given. Access Code: [email or web address]  URL: [email or web address]  Date: 03/16/2022  Prepared by: Clari Shown     Exercises  Standing Wrist Flexion Stretch - 2 x daily - 7 x weekly - 3 sets - 30 sec hold  Standing Wrist Extension Stretch - 2 x daily - 7 x weekly - 3 sets - 30 sec hold  Wrist Flexion AROM - 2 x daily - 7 x weekly - 3 sets - 10 reps  Wrist Extension AROM - 2 x daily - 7 x weekly - 3 sets - 10 reps  Wrist Radial Ulnar Deviation AROM - 2 x daily - 7 x weekly - 3 sets - 10 reps  Seated Forearm Pronation and Supination AROM - 2 x daily - 7 x weekly - 3 sets - 10 reps  Putty Squeezes - 1 x daily - 7 x weekly - 1 sets - 2 min hold    Plan: [x] Continue with current plan of care towards goals.         Time In: 3:00 pm            Time Out: 3:35 pm     Electronically signed by:  Florian Harvey PTA

## 2022-04-05 ENCOUNTER — HOSPITAL ENCOUNTER (OUTPATIENT)
Dept: PHYSICAL THERAPY | Facility: CLINIC | Age: 27
Setting detail: THERAPIES SERIES
Discharge: HOME | End: 2022-04-05
Payer: OTHER GOVERNMENT

## 2022-04-05 PROCEDURE — 97110 THERAPEUTIC EXERCISES: CPT

## 2022-04-05 NOTE — FLOWSHEET NOTE
[x] [address]    Outpatient Rehabilitation &  Therapy  [address] [place] [address]  P: [phone]  F: [phone]      Physical Therapy Daily Treatment Note    Date:  2022  Patient: Alirio Hess                : 1995                      MRN: [id number]  Physician: Ev Ferrera MD                         Insurance: [employer]lim [employer] ([employer])   Medical Diagnosis:   M77.8 (ICD-10-CM) - Left wrist tendonitis   S63.592D (ICD-10-CM) - Sprain of ulnar collateral ligament of left wrist, subsequent encounter      Rehab Codes: M25.532, M25.632, M62.81  Onset Date: 22               Next 's appt: PRN  Visit# / total visits:      Cancels/No Shows:     Subjective: pt having soreness this morning. Notes he was sore and had some pain over the weekend but would say it was very minimal, rating it at a 3/10.    Pain:  [] Yes  [x] No Location: L wrist Pain Rating: (0-10 scale) 0/10  Pain altered Tx:  [x] No  [] Yes  Action:  Comments:    Todays Treatment:  Modalities:   Precautions:  Exercises:  Exercise Reps/ Time Weight/ Level Comments   UBE 6' L9                Wrist flexion S 3x30\"       Wrist ext S 3x30\"                 Wrist Flex/Ext AROM x30       Wrist Ulnar/Radial Dev AROM x30      Supination/Pronation x30 3#           Wrist extension 3x10 3#    Wrist ulnar/radial dev 3x10 3#    Wrist flexion  3x10 3#          digiflex 30x green    Web gripper 30x red    Opposition 20xea Yellow/red clip red for 1st and 2nd digit, yellow for 3rd and 4th   Purple gripper 30x Red/yellow band 2 red for 1st and 2nd digit, red/yellow for 3rd and 4th   Wrist maze  5x       Blue gripper  30x     Putty Squeeze 2' Green     Wrist roll 5x 2.5#    Table pushups  20x     BOSU taps 20x  Fwd/bwd, ;lateral    Other:     Specific Instructions for next treatment: closed chain exercises       Treatment Charges: Mins Units   []  Modalities     [x]  Ther Exercise 40 3   []  Manual Therapy     []  Ther Activities     [] Aquatics     []  Vasocompression     []  Other     Total Treatment time 40 3       Assessment: [x] Progressing toward goals. Able to progress pt with increasing weight and reps for exercises with good tolerance noted. Added in BOSU taps for more closed chain work with no pain noted. Pt has met all short term goals this date. Pt has re-eval next visit and will determine next step for therapy after discussing with primary therapist.     [] No change. [] Other:      STG: (to be met in 6 treatments)  1. ? Pain: Patient will report pain as 0/10 at rest MET  2. ? ROM: Patient will improve (B) wrist AROM to equal in order to increase ease of performing ADLs MET  3. Patient will improve (B) wrist strength to 4+/5 in order to increase ease of performing care taking tasks, such as lifting children MET  4. Patient to be independent with home exercise program as demonstrated by performance with correct form without cues. MET  LTG: (to be met in 12 treatments)  1. Patient will report UEFS as 23% impaired in order to indicate improved overall quality of life   2. Patient will improve (L)  strength to 100 lbs in order to assist patient back into strength training at the gym  3. Patient will report being able to complete 10 pushups without pain in order to assist patient back into normal exercise routine and work activities   4. Patient will report 2/10 pain at the worst                    Patient goals: \"Strengthen area to full recovery\"    Pt. Education:  [x] Yes  [] No  [x] Reviewed Prior HEP/Ed  Method of Education: [x] Verbal  [x] Demo  [] Written  Comprehension of Education:  [x] Verbalizes understanding. [x] Demonstrates understanding. [] Needs review. [] Demonstrates/verbalizes HEP/Ed previously given. Access Code: [email or web address]  URL: [email or web address] com/  Date: 03/16/2022  Prepared by: Adrianne Marques     Exercises  Standing Wrist Flexion Stretch - 2 x daily - 7 x weekly - 3 sets - 30 sec hold  Standing

## 2022-04-08 ENCOUNTER — HOSPITAL ENCOUNTER (OUTPATIENT)
Dept: PHYSICAL THERAPY | Facility: CLINIC | Age: 27
Setting detail: THERAPIES SERIES
Discharge: HOME | End: 2022-04-08
Payer: OTHER GOVERNMENT

## 2022-04-08 PROCEDURE — 97110 THERAPEUTIC EXERCISES: CPT

## 2022-04-08 PROCEDURE — 97530 THERAPEUTIC ACTIVITIES: CPT

## 2022-04-08 NOTE — DISCHARGE SUMMARY
[x] [address]    Outpatient Rehabilitation &  Therapy  [address] [facility]  P: [phone]  F: [phone]      Physical Therapy Daily Treatment Note    Date:  2022  Patient: Renaldo Mcnulty                : 1995                      MRN: [id number]  Physician: Addison Fletcher MD                         Insurance: Tarri Sessions ([employer])   Medical Diagnosis:   M77.8 (ICD-10-CM) - Left wrist tendonitis   S63.592D (ICD-10-CM) - Sprain of ulnar collateral ligament of left wrist, subsequent encounter      Rehab Codes: M25.532, M25.632, M62.81  Onset Date: 22               Next 's appt: PRN  Visit# / total visits:      Cancels/No Shows:     Subjective: pt stating that his wrist is feeling good. States that he at times has wrist discomfort with more strenuous activities, however states it is bearable. Patient states that he is now able to lift his kids and lift groceries without pain.      Pain:  [] Yes  [x] No Location: L wrist Pain Rating: (0-10 scale) 0/10  Pain altered Tx:  [x] No  [] Yes  Action:  Comments:    Todays Treatment:  Modalities:   Precautions:  Exercises:  Exercise Reps/ Time Weight/ Level Comments    UBE 6' L9    x              Wrist flexion S 3x30\"     x   Wrist ext S 3x30\"     x              Wrist Flex/Ext AROM x30        Wrist Ulnar/Radial Dev AROM x30       Supination/Pronation x30 3#   x          Wrist extension 3x10 3#  x   Wrist ulnar/radial dev 3x10 3#  x   Wrist flexion  3x10 3#  x          digiflex 30x green     Web gripper 30x red     Opposition 20xea Yellow/red clip red for 1st and 2nd digit, yellow for 3rd and 4th    Purple gripper 30x Red/yellow band 2 red for 1st and 2nd digit, red/yellow for 3rd and 4th    Wrist maze  5x        Blue gripper  30x      Putty Squeeze 2' Green      Wrist roll 5x 2.5#  x   Table pushups  20x   x   BOSU taps 20x  Fwd/bwd, ;lateral     Shoulder taps 20x   x   Other:     Specific Instructions for next treatment: closed chain exercises       Treatment Charges: Mins Units   []  Modalities     [x]  Ther Exercise 25 1   []  Manual Therapy     [x]  Ther Activities 10 1   []  Aquatics     []  Vasocompression     []  Other     Total Treatment time 35 2       Assessment: [x] Progressing toward goals. Reassessed patient's goals this date with excellent progress. Patient met 4/4 short term goals and 3/4 long term goals. Patient demonstrates improvements in pain tolerance, (L) wrist AROM, (B) wrist strength,  strength and weight bearing through wrists. Due to patient meeting all goals, patient will be discharged at this time in order to continue to improve (L) wrist strength and  strength at home. Updated patient's HEP and reviewed with patient in order to prepare patient for discharge. Patient demonstrated all ex well with no increase in (L) wrist pain. [] No change. [] Other:      STG: (to be met in 6 treatments)  1. ? Pain: Patient will report pain as 0/10 at rest MET  2. ? ROM: Patient will improve (B) wrist AROM to equal in order to increase ease of performing ADLs MET  3. Patient will improve (B) wrist strength to 4+/5 in order to increase ease of performing care taking tasks, such as lifting children MET  4. Patient to be independent with home exercise program as demonstrated by performance with correct form without cues. MET  LTG: (to be met in 12 treatments)  1. Patient will report UEFS as 23% impaired in order to indicate improved overall quality of life Patient reports UEFS as 0% functionally impaired (MET)  2. Patient will improve (L)  strength to 100 lbs in order to assist patient back into strength training at the gym Patient demonstrates (L)  strength as 115 lbs (MET)   3.  Patient will report being able to complete 10 pushups without pain in order to assist patient back into normal exercise routine and work activities Patient able to complete 10 push ups without pain in his (L) wrist (MET) 4. Patient will report 2/10 pain at the worst Patient reports pain as 3/10 only when doing strenuous activity (ongoing)                      Patient goals: \"Strengthen area to full recovery\"    Pt. Education:  [x] Yes  [] No  [x] Reviewed Prior HEP/Ed  Method of Education: [x] Verbal  [x] Demo  [] Written  Comprehension of Education:  [x] Verbalizes understanding. [x] Demonstrates understanding. [] Needs review. [] Demonstrates/verbalizes HEP/Ed previously given. Access Code: [email or web address]  URL: [email or web address]  Date: 03/16/2022  Prepared by: Perri Nation     Exercises  Standing Wrist Flexion Stretch - 2 x daily - 7 x weekly - 3 sets - 30 sec hold  Standing Wrist Extension Stretch - 2 x daily - 7 x weekly - 3 sets - 30 sec hold  Wrist Flexion AROM - 2 x daily - 7 x weekly - 3 sets - 10 reps  Wrist Extension AROM - 2 x daily - 7 x weekly - 3 sets - 10 reps  Wrist Radial Ulnar Deviation AROM - 2 x daily - 7 x weekly - 3 sets - 10 reps  Seated Forearm Pronation and Supination AROM - 2 x daily - 7 x weekly - 3 sets - 10 reps  Putty Squeezes - 1 x daily - 7 x weekly - 1 sets - 2 min hold    Plan: [x] Patient to be discharged at this time         Time In: 3:05 pm            Time Out: 3:40 pm     Electronically signed by:  Perri Nation, PT

## 2022-08-02 ENCOUNTER — OFFICE VISIT (OUTPATIENT)
Dept: PRIMARY CARE CLINIC | Age: 27
End: 2022-08-02
Payer: OTHER GOVERNMENT

## 2022-08-02 VITALS
DIASTOLIC BLOOD PRESSURE: 76 MMHG | WEIGHT: 213.4 LBS | OXYGEN SATURATION: 98 % | SYSTOLIC BLOOD PRESSURE: 122 MMHG | BODY MASS INDEX: 31.51 KG/M2 | HEART RATE: 84 BPM

## 2022-08-02 DIAGNOSIS — Z56.6 STRESS AT WORK: ICD-10-CM

## 2022-08-02 DIAGNOSIS — F41.9 ANXIETY: ICD-10-CM

## 2022-08-02 DIAGNOSIS — Z79.899 MEDICATION MANAGEMENT: Primary | ICD-10-CM

## 2022-08-02 PROCEDURE — 99213 OFFICE O/P EST LOW 20 MIN: CPT | Performed by: FAMILY MEDICINE

## 2022-08-02 RX ORDER — ESCITALOPRAM OXALATE 10 MG/1
10 TABLET ORAL DAILY
Qty: 30 TABLET | Refills: 1 | Status: SHIPPED | OUTPATIENT
Start: 2022-08-02 | End: 2022-09-02 | Stop reason: SDUPTHER

## 2022-08-02 SDOH — HEALTH STABILITY - MENTAL HEALTH: OTHER PHYSICAL AND MENTAL STRAIN RELATED TO WORK: Z56.6

## 2022-08-02 ASSESSMENT — PATIENT HEALTH QUESTIONNAIRE - PHQ9
SUM OF ALL RESPONSES TO PHQ QUESTIONS 1-9: 2
2. FEELING DOWN, DEPRESSED OR HOPELESS: 1
SUM OF ALL RESPONSES TO PHQ QUESTIONS 1-9: 2
1. LITTLE INTEREST OR PLEASURE IN DOING THINGS: 1
SUM OF ALL RESPONSES TO PHQ9 QUESTIONS 1 & 2: 2

## 2022-08-02 NOTE — PROGRESS NOTES
[facility] PRIMARY CARE  [address] [place] [id number]  Dept: Farrukh Phelps is a 33 y.o. male Established patient, who presents today for his medical conditions/complaintsas noted below. Chief Complaint   Patient presents with    Anxiety     Stress from work       HPI:     HPI  Increased work stress. Has had more responsibilities pushed on him that he didn't really want. Works for the [employer]. They are working on a replacement. Has had trouble focusing, having increased worried,repetitive thoughts. Feels like he has to withdraw from family to get himself calmed down. Some anxiety attacks: physical sx: heart palpations etc  No panic attacks: had one in HS. He remembers having anxiety in HS  He doesn't feel depressed, he did have depression when he was stationed in [employer]. Reviewed prior notes None  Reviewed previous  none    LDL Cholesterol (mg/dL)   Date Value   04/22/2021 114       (goal LDL is <100)   AST (U/L)   Date Value   09/30/2021 19     ALT (U/L)   Date Value   09/30/2021 21     BUN (mg/dL)   Date Value   09/30/2021 18     BP Readings from Last 3 Encounters:   08/02/22 122/76   03/22/22 134/82   03/04/22 122/82          (goal 120/80)    Past Medical History:   Diagnosis Date    Chronic back pain       History reviewed. No pertinent surgical history. Family History   Problem Relation Age of Onset    Heart Failure Mother     Diabetes Father        Social History     Tobacco Use    Smoking status: Never    Smokeless tobacco: Never   Substance Use Topics    Alcohol use: Never      Current Outpatient Medications   Medication Sig Dispense Refill    escitalopram (LEXAPRO) 10 MG tablet Take 1 tablet by mouth in the morning. 30 tablet 1     No current facility-administered medications for this visit.      No Known Allergies    Health Maintenance   Topic Date Due    Varicella vaccine (1 of 2 - 2-dose childhood series) Never done    HPV answered. Pt voiced understanding. Reviewed health maintenance. Instructed to continue current medications, diet andexercise. Patient agreed with treatment plan. Follow up as directed.      Electronicallysigned by Mitchell Rai MD on 8/2/2022 at 12:29 PM

## 2022-09-02 ENCOUNTER — OFFICE VISIT (OUTPATIENT)
Dept: PRIMARY CARE CLINIC | Age: 27
End: 2022-09-02
Payer: OTHER GOVERNMENT

## 2022-09-02 VITALS
SYSTOLIC BLOOD PRESSURE: 118 MMHG | HEART RATE: 82 BPM | OXYGEN SATURATION: 98 % | BODY MASS INDEX: 31.93 KG/M2 | WEIGHT: 215.6 LBS | DIASTOLIC BLOOD PRESSURE: 80 MMHG | HEIGHT: 69 IN

## 2022-09-02 DIAGNOSIS — Z56.6 STRESS AT WORK: ICD-10-CM

## 2022-09-02 DIAGNOSIS — Z79.899 MEDICATION MANAGEMENT: Primary | ICD-10-CM

## 2022-09-02 DIAGNOSIS — F41.9 ANXIETY: ICD-10-CM

## 2022-09-02 PROCEDURE — 99213 OFFICE O/P EST LOW 20 MIN: CPT | Performed by: FAMILY MEDICINE

## 2022-09-02 RX ORDER — ESCITALOPRAM OXALATE 10 MG/1
10 TABLET ORAL DAILY
Qty: 30 TABLET | Refills: 5 | Status: SHIPPED | OUTPATIENT
Start: 2022-09-02

## 2022-09-02 SDOH — HEALTH STABILITY - MENTAL HEALTH: OTHER PHYSICAL AND MENTAL STRAIN RELATED TO WORK: Z56.6

## 2022-09-02 NOTE — PROGRESS NOTES
[facility] CARE  [address] [place] [id number]  Dept: Farrukh Phelps is a 33 y.o. male Established patient, who presents today for his medical conditions/complaintsas noted below. Chief Complaint   Patient presents with    Follow-up     Patient is here today for follow up on medication-- lexapro-- patient states medication is working well with no concerns        HPI:     HPI  Feels well. He has no panic or anxiety attacks. When he starts feeling a little unfocused he is able to pull himself back and get back on track. Using T gel for the scalp since starting to have red  rash ant scalp near forehead  Using the same hair products. Seems to break out since he started lexapro    Reviewed prior notes None  Reviewed previous  none    LDL Cholesterol (mg/dL)   Date Value   04/22/2021 114       (goal LDL is <100)   AST (U/L)   Date Value   09/30/2021 19     ALT (U/L)   Date Value   09/30/2021 21     BUN (mg/dL)   Date Value   09/30/2021 18     BP Readings from Last 3 Encounters:   09/02/22 118/80   08/02/22 122/76   03/22/22 134/82          (goal 120/80)    Past Medical History:   Diagnosis Date    Chronic back pain       History reviewed. No pertinent surgical history. Family History   Problem Relation Age of Onset    Heart Failure Mother     Diabetes Father        Social History     Tobacco Use    Smoking status: Never    Smokeless tobacco: Never   Substance Use Topics    Alcohol use: Never      Current Outpatient Medications   Medication Sig Dispense Refill    escitalopram (LEXAPRO) 10 MG tablet Take 1 tablet by mouth daily 30 tablet 5     No current facility-administered medications for this visit.      No Known Allergies    Health Maintenance   Topic Date Due    Varicella vaccine (1 of 2 - 2-dose childhood series) Never done    HPV vaccine (1 - Male 2-dose series) Never done    HIV screen  Never done    Hepatitis C screen  Never done DTaP/Tdap/Td vaccine (1 - Tdap) Never done    COVID-19 Vaccine (3 - Booster for Pfizer series) 11/06/2021    Flu vaccine (1) 09/01/2022    Depression Screen  08/02/2023    Hepatitis A vaccine  Aged Out    Hepatitis B vaccine  Aged Out    Hib vaccine  Aged Out    Meningococcal (ACWY) vaccine  Aged Out    Pneumococcal 0-64 years Vaccine  Aged Out       Subjective:      Review of Systems   Constitutional: Negative. Objective:     /80   Pulse 82   Ht 5' 9\" (1.753 m)   Wt 215 lb 9.6 oz (97.8 kg)   SpO2 98%   BMI 31.84 kg/m²   Physical Exam  Constitutional:       Appearance: Normal appearance. Skin:     Findings: Rash present. Comments: Few  small red papules anterior scalp near the forehead. 2 or 3 in the lateral scalp where he shaved his head. He does use hair gel   Neurological:      Mental Status: He is alert and oriented to person, place, and time. Psychiatric:         Mood and Affect: Mood normal.         Behavior: Behavior normal.         Thought Content: Thought content normal.       Assessment:       Diagnosis Orders   1. Medication management        2. Anxiety  escitalopram (LEXAPRO) 10 MG tablet      3. Stress at work  escitalopram (LEXAPRO) 10 MG tablet             Plan:      Return in about 5 months (around 2/2/2023). Call sooner if he has issues or if he wants to reduce the dose. If he does want to wean off I would go down to 5 mg dose first.  No orders of the defined types were placed in this encounter. Orders Placed This Encounter   Medications    escitalopram (LEXAPRO) 10 MG tablet     Sig: Take 1 tablet by mouth daily     Dispense:  30 tablet     Refill:  5         Patient given educationalmaterials - see patient instructions. Discussed use, benefit, and side effectsof prescribed medications. All patient questions answered. Pt voiced understanding. Reviewed health maintenance. Instructed to continue current medications, diet andexercise.   Patient agreed with treatment plan. Follow up as directed.      Electronicallysigned by Lisy Song MD on 9/2/2022 at 9:03 AM

## 2022-09-15 ENCOUNTER — OFFICE VISIT (OUTPATIENT)
Dept: PRIMARY CARE CLINIC | Age: 27
End: 2022-09-15
Payer: OTHER GOVERNMENT

## 2022-09-15 VITALS
HEART RATE: 74 BPM | OXYGEN SATURATION: 94 % | BODY MASS INDEX: 32.76 KG/M2 | DIASTOLIC BLOOD PRESSURE: 74 MMHG | SYSTOLIC BLOOD PRESSURE: 110 MMHG | WEIGHT: 221.2 LBS | HEIGHT: 69 IN

## 2022-09-15 DIAGNOSIS — Z23 NEED FOR VACCINATION: ICD-10-CM

## 2022-09-15 DIAGNOSIS — M54.50 LUMBAR BACK PAIN: Primary | ICD-10-CM

## 2022-09-15 DIAGNOSIS — M51.9 LUMBAR DISC DISEASE: ICD-10-CM

## 2022-09-15 PROCEDURE — 90674 CCIIV4 VAC NO PRSV 0.5 ML IM: CPT | Performed by: FAMILY MEDICINE

## 2022-09-15 PROCEDURE — 99214 OFFICE O/P EST MOD 30 MIN: CPT | Performed by: FAMILY MEDICINE

## 2022-09-15 PROCEDURE — 90471 IMMUNIZATION ADMIN: CPT | Performed by: FAMILY MEDICINE

## 2022-09-15 SDOH — ECONOMIC STABILITY: FOOD INSECURITY: WITHIN THE PAST 12 MONTHS, THE FOOD YOU BOUGHT JUST DIDN'T LAST AND YOU DIDN'T HAVE MONEY TO GET MORE.: NEVER TRUE

## 2022-09-15 SDOH — ECONOMIC STABILITY: FOOD INSECURITY: WITHIN THE PAST 12 MONTHS, YOU WORRIED THAT YOUR FOOD WOULD RUN OUT BEFORE YOU GOT MONEY TO BUY MORE.: NEVER TRUE

## 2022-09-15 ASSESSMENT — ENCOUNTER SYMPTOMS: BACK PAIN: 1

## 2022-09-15 ASSESSMENT — SOCIAL DETERMINANTS OF HEALTH (SDOH): HOW HARD IS IT FOR YOU TO PAY FOR THE VERY BASICS LIKE FOOD, HOUSING, MEDICAL CARE, AND HEATING?: NOT HARD AT ALL

## 2022-09-15 NOTE — PROGRESS NOTES
[facility] PRIMARY CARE  [address] [place] [id number]  Dept: Farrukh Phelps is a 33 y.o. male Established patient, who presents today for his medical conditions/complaintsas noted below. Chief Complaint   Patient presents with    Back Pain     Pt has updated [employer] profile paper work needs to be signed. HPI:     HPI  Ran 2.5 miles on Monday: had severe back spasms into the buttock areas bilaterally. Chronic lower back pain, he has seen physical therapy and orthopedics. [place] he also saw a specialist and had an MRI of the lumbar spine  Has needed a standing desk to help his back. Does regular exercises. Reviewed prior notes Orthopedics  Reviewed previous Imaging    Previous MRI Scanned in : herniated disc L4-5 and L5-S1 and arthritis. LDL Cholesterol (mg/dL)   Date Value   04/22/2021 114       (goal LDL is <100)   AST (U/L)   Date Value   09/30/2021 19     ALT (U/L)   Date Value   09/30/2021 21     BUN (mg/dL)   Date Value   09/30/2021 18     BP Readings from Last 3 Encounters:   09/15/22 110/74   09/02/22 118/80   08/02/22 122/76          (goal 120/80)    Past Medical History:   Diagnosis Date    Chronic back pain       History reviewed. No pertinent surgical history. Family History   Problem Relation Age of Onset    Heart Failure Mother     Diabetes Father        Social History     Tobacco Use    Smoking status: Never    Smokeless tobacco: Never   Substance Use Topics    Alcohol use: Never      Current Outpatient Medications   Medication Sig Dispense Refill    escitalopram (LEXAPRO) 10 MG tablet Take 1 tablet by mouth daily 30 tablet 5     No current facility-administered medications for this visit.      No Known Allergies    Health Maintenance   Topic Date Due    Varicella vaccine (1 of 2 - 2-dose childhood series) Never done    HPV vaccine (1 - Male 2-dose series) Never done    HIV screen  Never done    Hepatitis C screen  Never done    DTaP/Tdap/Td vaccine (1 - Tdap) Never done    COVID-19 Vaccine (3 - Booster for Pfizer series) 11/06/2021    Depression Screen  08/02/2023    Flu vaccine  Completed    Hepatitis A vaccine  Aged Out    Hepatitis B vaccine  Aged Out    Hib vaccine  Aged Out    Meningococcal (ACWY) vaccine  Aged Out    Pneumococcal 0-64 years Vaccine  Aged Out       Subjective:      Review of Systems   Musculoskeletal:  Positive for back pain. Psychiatric/Behavioral:          Stress and anxiety is much better on the Lexapro   He is also noted that he is losing weight since he has been on the Lexapro. Objective:     /74   Pulse 74   Ht 5' 9\" (1.753 m)   Wt 221 lb 3.2 oz (100.3 kg)   SpO2 94%   BMI 32.67 kg/m²   Physical Exam  Vitals and nursing note reviewed. Constitutional:       Appearance: Normal appearance. HENT:      Head: Normocephalic and atraumatic. Musculoskeletal:      Comments: lumbar range of motion is within normal limits. Neurological:      Mental Status: He is alert and oriented to person, place, and time. Deep Tendon Reflexes:      Reflex Scores:       Patellar reflexes are 2+ on the right side and 2+ on the left side. Achilles reflexes are 2+ on the right side and 2+ on the left side. Comments: Negative straight leg raise bilaterally. Toes dorsiflexion strength 5/5 bilaterally   Psychiatric:         Mood and Affect: Mood normal.         Behavior: Behavior normal.         Thought Content: Thought content normal.       Assessment:       Diagnosis Orders   1. Lumbar back pain        2. Lumbar disc disease        3. Need for vaccination  Influenza, FLUCELVAX, (age 10 mo+), IM, PF, 0.5 mL           Plan:      Return in about 6 months (around 3/15/2023). Reviewed his form for work, this is scanned in. Call prn.      Orders Placed This Encounter   Procedures    Influenza, FLUCELVAX, (age 10 mo+), IM, PF, 0.5 mL     No orders of the defined types were placed in this encounter. Patient given educationalmaterials - see patient instructions. Discussed use, benefit, and side effectsof prescribed medications. All patient questions answered. Pt voiced understanding. Reviewed health maintenance. Instructed to continue current medications, diet andexercise. Patient agreed with treatment plan. Follow up as directed.      Electronicallysigned by Beka Mendoza MD on 9/15/2022 at 8:50 AM

## 2022-10-24 ENCOUNTER — OFFICE VISIT (OUTPATIENT)
Dept: PRIMARY CARE CLINIC | Age: 27
End: 2022-10-24
Payer: OTHER GOVERNMENT

## 2022-10-24 VITALS
HEART RATE: 77 BPM | BODY MASS INDEX: 33.15 KG/M2 | WEIGHT: 223.8 LBS | OXYGEN SATURATION: 98 % | HEIGHT: 69 IN | SYSTOLIC BLOOD PRESSURE: 118 MMHG | DIASTOLIC BLOOD PRESSURE: 76 MMHG

## 2022-10-24 DIAGNOSIS — M54.50 LUMBAR BACK PAIN: Primary | ICD-10-CM

## 2022-10-24 DIAGNOSIS — M51.9 LUMBAR DISC DISEASE: ICD-10-CM

## 2022-10-24 PROCEDURE — 99213 OFFICE O/P EST LOW 20 MIN: CPT | Performed by: FAMILY MEDICINE

## 2022-10-24 ASSESSMENT — PATIENT HEALTH QUESTIONNAIRE - PHQ9
SUM OF ALL RESPONSES TO PHQ QUESTIONS 1-9: 0
2. FEELING DOWN, DEPRESSED OR HOPELESS: 0
SUM OF ALL RESPONSES TO PHQ9 QUESTIONS 1 & 2: 0
1. LITTLE INTEREST OR PLEASURE IN DOING THINGS: 0
SUM OF ALL RESPONSES TO PHQ QUESTIONS 1-9: 0

## 2022-10-24 NOTE — PROGRESS NOTES
[facility] PRIMARY CARE  [address] [place] [id number]  Dept: Farrukh Phelps is a 33 y.o. male Established patient, who presents today for his medical conditions/complaintsas noted below. Chief Complaint   Patient presents with    Forms     Pt here today for form for the [employer]       HPI:     HPI    Has chronic back pain  Cannot run 2 miles: flares up his back and left leg/sciatic pain  Had PT test on Friday and back and legs are work  Gets lower back and left back pain    Previous MRI scanned into chart    Reviewed prior notes None  Reviewed previous Labs and Imaging    LDL Cholesterol (mg/dL)   Date Value   04/22/2021 114       (goal LDL is <100)   AST (U/L)   Date Value   09/30/2021 19     ALT (U/L)   Date Value   09/30/2021 21     BUN (mg/dL)   Date Value   09/30/2021 18     BP Readings from Last 3 Encounters:   10/24/22 118/76   09/15/22 110/74   09/02/22 118/80          (goal 120/80)    Past Medical History:   Diagnosis Date    Chronic back pain       History reviewed. No pertinent surgical history. Family History   Problem Relation Age of Onset    Heart Failure Mother     Diabetes Father        Social History     Tobacco Use    Smoking status: Never    Smokeless tobacco: Never   Substance Use Topics    Alcohol use: Never      Current Outpatient Medications   Medication Sig Dispense Refill    escitalopram (LEXAPRO) 10 MG tablet Take 1 tablet by mouth daily 30 tablet 5     No current facility-administered medications for this visit.      No Known Allergies    Health Maintenance   Topic Date Due    Varicella vaccine (1 of 2 - 2-dose childhood series) Never done    HPV vaccine (1 - Male 2-dose series) Never done    HIV screen  Never done    Hepatitis C screen  Never done    DTaP/Tdap/Td vaccine (1 - Tdap) Never done    COVID-19 Vaccine (3 - Booster for Pfizer series) 08/01/2021    Depression Screen  08/02/2023    Flu vaccine  Completed Hepatitis A vaccine  Aged Out    Hib vaccine  Aged Out    Meningococcal (ACWY) vaccine  Aged Out    Pneumococcal 0-64 years Vaccine  Aged Out       Subjective:      Review of Systems   Constitutional: Negative. Objective:     /76   Pulse 77   Ht 5' 9\" (1.753 m)   Wt 223 lb 12.8 oz (101.5 kg)   SpO2 98%   BMI 33.05 kg/m²   Physical Exam  Vitals and nursing note reviewed. Constitutional:       Appearance: Normal appearance. HENT:      Head: Normocephalic and atraumatic. Pulmonary:      Effort: No respiratory distress. Musculoskeletal:      Comments: Lumbar range of motion forward flexion 90 degrees and there is tight and painful. Lumbar extension 10 degrees, side to side is decreased. Hips are symmetrical   Neurological:      Mental Status: He is alert and oriented to person, place, and time. Deep Tendon Reflexes:      Reflex Scores:       Patellar reflexes are 2+ on the right side and 2+ on the left side. Achilles reflexes are 2+ on the right side and 2+ on the left side. Comments: Neg SLR bilaterally   Toe strength dorsi flexion 4-5 over 5 bilaterally, hip flexion strength 5/5 bilaterally     Psychiatric:         Mood and Affect: Mood normal.         Behavior: Behavior normal.         Thought Content: Thought content normal.       Assessment:       Diagnosis Orders   1. Lumbar back pain        2. Lumbar disc disease             Plan:      No follow-ups on file. Patient needs limitations on his physical activity evaluation. Forms reviewed and filled out  No orders of the defined types were placed in this encounter. No orders of the defined types were placed in this encounter. Patient given educationalmaterials - see patient instructions. Discussed use, benefit, and side effectsof prescribed medications. All patient questions answered. Pt voiced understanding. Reviewed health maintenance. Instructed to continue current medications, diet andexercise.   Patient agreed with treatment plan. Follow up as directed.      Electronicallysigned by Zack Gross MD on 10/24/2022 at 11:15 AM

## 2023-01-04 ENCOUNTER — PATIENT MESSAGE (OUTPATIENT)
Dept: PRIMARY CARE CLINIC | Age: 28
End: 2023-01-04

## 2023-02-03 ENCOUNTER — OFFICE VISIT (OUTPATIENT)
Dept: PRIMARY CARE CLINIC | Age: 28
End: 2023-02-03
Payer: OTHER GOVERNMENT

## 2023-02-03 VITALS
BODY MASS INDEX: 33.09 KG/M2 | HEART RATE: 84 BPM | OXYGEN SATURATION: 98 % | SYSTOLIC BLOOD PRESSURE: 120 MMHG | DIASTOLIC BLOOD PRESSURE: 74 MMHG | HEIGHT: 69 IN | WEIGHT: 223.4 LBS

## 2023-02-03 DIAGNOSIS — Z56.6 STRESS AT WORK: ICD-10-CM

## 2023-02-03 DIAGNOSIS — F41.9 ANXIETY: ICD-10-CM

## 2023-02-03 DIAGNOSIS — M51.26 LUMBAR DISC HERNIATION: Chronic | ICD-10-CM

## 2023-02-03 DIAGNOSIS — G47.09 OTHER INSOMNIA: Primary | ICD-10-CM

## 2023-02-03 DIAGNOSIS — R09.81 NASAL CONGESTION: ICD-10-CM

## 2023-02-03 PROCEDURE — 99213 OFFICE O/P EST LOW 20 MIN: CPT | Performed by: FAMILY MEDICINE

## 2023-02-03 RX ORDER — ESCITALOPRAM OXALATE 20 MG/1
20 TABLET ORAL DAILY
Qty: 30 TABLET | Refills: 2 | Status: SHIPPED | OUTPATIENT
Start: 2023-02-03

## 2023-02-03 RX ORDER — DIPHENHYDRAMINE HCL 25 MG
25 CAPSULE ORAL NIGHTLY PRN
Qty: 30 CAPSULE | Refills: 0 | COMMUNITY
Start: 2023-02-03 | End: 2023-02-13

## 2023-02-03 RX ORDER — ECHINACEA PURPUREA EXTRACT 125 MG
2 TABLET ORAL 3 TIMES DAILY
Qty: 1 EACH | Refills: 3 | COMMUNITY
Start: 2023-02-03

## 2023-02-03 SDOH — HEALTH STABILITY - MENTAL HEALTH: OTHER PHYSICAL AND MENTAL STRAIN RELATED TO WORK: Z56.6

## 2023-02-03 SDOH — ECONOMIC STABILITY: FOOD INSECURITY: WITHIN THE PAST 12 MONTHS, YOU WORRIED THAT YOUR FOOD WOULD RUN OUT BEFORE YOU GOT MONEY TO BUY MORE.: NEVER TRUE

## 2023-02-03 SDOH — ECONOMIC STABILITY: FOOD INSECURITY: WITHIN THE PAST 12 MONTHS, THE FOOD YOU BOUGHT JUST DIDN'T LAST AND YOU DIDN'T HAVE MONEY TO GET MORE.: NEVER TRUE

## 2023-02-03 SDOH — ECONOMIC STABILITY: HOUSING INSECURITY
IN THE LAST 12 MONTHS, WAS THERE A TIME WHEN YOU DID NOT HAVE A STEADY PLACE TO SLEEP OR SLEPT IN A SHELTER (INCLUDING NOW)?: NO

## 2023-02-03 SDOH — ECONOMIC STABILITY: INCOME INSECURITY: HOW HARD IS IT FOR YOU TO PAY FOR THE VERY BASICS LIKE FOOD, HOUSING, MEDICAL CARE, AND HEATING?: NOT HARD AT ALL

## 2023-02-03 ASSESSMENT — PATIENT HEALTH QUESTIONNAIRE - PHQ9
SUM OF ALL RESPONSES TO PHQ QUESTIONS 1-9: 0
SUM OF ALL RESPONSES TO PHQ QUESTIONS 1-9: 0
1. LITTLE INTEREST OR PLEASURE IN DOING THINGS: 0
2. FEELING DOWN, DEPRESSED OR HOPELESS: 0
SUM OF ALL RESPONSES TO PHQ QUESTIONS 1-9: 0
SUM OF ALL RESPONSES TO PHQ9 QUESTIONS 1 & 2: 0
SUM OF ALL RESPONSES TO PHQ QUESTIONS 1-9: 0

## 2023-02-03 NOTE — PROGRESS NOTES
[facility]  [facility] PRIMARY CARE  [address]  [facility] [address]  Dept: [phone]    Addison Guajardo is a 27 y.o. male Established patient, who presents today for his medical conditions/complaintsas noted below.      Chief Complaint   Patient presents with    Anxiety     Medication fu    Immunizations     Declined pfizer       HPI:     HPI  Anxiety under control, still has some issues concentrating at work.     Reviewed prior notes None  Reviewed previous Labs    Mother  in NOvember, she smoked and was an alcoholic. She  of CHF.    Trouble sleeping since mother's death, doesn't fall asleep until about 2 am then has to get up for work.   Tried melatonin: calms him but still has trouble sleeping.   Snoring more, dry air in bed room.   Working out helps.     LDL Cholesterol (mg/dL)   Date Value   2021 114       (goal LDL is <100)   AST (U/L)   Date Value   2021 19     ALT (U/L)   Date Value   2021 21     BUN (mg/dL)   Date Value   2021 18     BP Readings from Last 3 Encounters:   23 120/74   10/24/22 118/76   09/15/22 110/74          (goal 120/80)    Past Medical History:   Diagnosis Date    Chronic back pain       History reviewed. No pertinent surgical history.    Family History   Problem Relation Age of Onset    Heart Failure Mother     Diabetes Mother     Diabetes Father        Social History     Tobacco Use    Smoking status: Never    Smokeless tobacco: Never   Substance Use Topics    Alcohol use: Not Currently      Current Outpatient Medications   Medication Sig Dispense Refill    escitalopram (LEXAPRO) 20 MG tablet Take 1 tablet by mouth daily 30 tablet 2    diphenhydrAMINE (BENADRYL ALLERGY) 25 MG capsule Take 1 capsule by mouth nightly as needed for Itching 30 capsule 0    sodium chloride (ALTAMIST SPRAY) 0.65 % nasal spray 2 sprays by Nasal route in the morning, at noon, and at bedtime Simply Saline aerosol can 1 each 3     No current  facility-administered medications for this visit. No Known Allergies    Health Maintenance   Topic Date Due    HPV vaccine (1 - Male 2-dose series) Never done    HIV screen  Never done    Hepatitis C screen  Never done    COVID-19 Vaccine (3 - Booster for Pfizer series) 08/01/2021    Depression Screen  02/03/2024    DTaP/Tdap/Td vaccine (2 - Td or Tdap) 09/24/2024    Varicella vaccine  Completed    Flu vaccine  Completed    Hepatitis A vaccine  Aged Out    Hib vaccine  Aged Out    Meningococcal (ACWY) vaccine  Aged Out    Pneumococcal 0-64 years Vaccine  Aged Out       Subjective:      Review of Systems   Constitutional: Negative. Objective:     /74   Pulse 84   Ht 5' 9\" (1.753 m)   Wt 223 lb 6.4 oz (101.3 kg)   SpO2 98%   BMI 32.99 kg/m²   Physical Exam  Vitals and nursing note reviewed. Constitutional:       Appearance: Normal appearance. He is normal weight. HENT:      Head: Normocephalic and atraumatic. Right Ear: Tympanic membrane, ear canal and external ear normal.      Left Ear: Tympanic membrane, ear canal and external ear normal.      Nose: Congestion present. Comments: Right nose: swollen, red turbinates. Left side has redness but no swelling     Mouth/Throat:      Mouth: Mucous membranes are moist.      Pharynx: No posterior oropharyngeal erythema. Lymphadenopathy:      Cervical: No cervical adenopathy. Neurological:      Mental Status: He is alert and oriented to person, place, and time. Psychiatric:         Mood and Affect: Mood normal.         Behavior: Behavior normal.         Thought Content: Thought content normal.       Assessment:       Diagnosis Orders   1. Other insomnia  escitalopram (LEXAPRO) 20 MG tablet    diphenhydrAMINE (BENADRYL ALLERGY) 25 MG capsule      2. Lumbar disc herniation        3. Anxiety  escitalopram (LEXAPRO) 20 MG tablet      4. Stress at work  escitalopram (LEXAPRO) 20 MG tablet      5.  Nasal congestion  sodium chloride (ALTAMIST SPRAY) 0.65 % nasal spray             Plan:      Return in about 3 months (around 5/3/2023) for check on medds, insomnia. If snoring not better with above med consider Flonase/nasocort  Humidifier in the bedroom  If still not sleeping with benadryl and saline nose spray consider a few days of sleeping pill. Increase lexapro for 2-3 months and see if that helps his sleeping and stress. Consider decreasing dose back to 10 mg in 3 months. No orders of the defined types were placed in this encounter. Orders Placed This Encounter   Medications    escitalopram (LEXAPRO) 20 MG tablet     Sig: Take 1 tablet by mouth daily     Dispense:  30 tablet     Refill:  2    diphenhydrAMINE (BENADRYL ALLERGY) 25 MG capsule     Sig: Take 1 capsule by mouth nightly as needed for Itching     Dispense:  30 capsule     Refill:  0    sodium chloride (ALTAMIST SPRAY) 0.65 % nasal spray     Si sprays by Nasal route in the morning, at noon, and at bedtime Simply Saline aerosol can     Dispense:  1 each     Refill:  3       Patient given educationalmaterials - see patient instructions. Discussed use, benefit, and side effectsof prescribed medications. All patient questions answered. Pt voiced understanding. Reviewed health maintenance. Instructed to continue current medications, diet andexercise. Patient agreed with treatment plan. Follow up as directed.      Electronicallysigned by Marbin Henriquez MD on 2/3/2023 at 9:02 AM

## 2023-03-21 ENCOUNTER — OFFICE VISIT (OUTPATIENT)
Dept: FAMILY MEDICINE CLINIC | Age: 28
End: 2023-03-21
Payer: OTHER GOVERNMENT

## 2023-03-21 VITALS
TEMPERATURE: 98.2 F | OXYGEN SATURATION: 99 % | WEIGHT: 227 LBS | RESPIRATION RATE: 15 BRPM | HEART RATE: 91 BPM | SYSTOLIC BLOOD PRESSURE: 112 MMHG | DIASTOLIC BLOOD PRESSURE: 68 MMHG | BODY MASS INDEX: 33.52 KG/M2

## 2023-03-21 DIAGNOSIS — B34.9 VIRAL ILLNESS: Primary | ICD-10-CM

## 2023-03-21 DIAGNOSIS — J02.9 SORE THROAT: ICD-10-CM

## 2023-03-21 DIAGNOSIS — Z20.818 EXPOSURE TO STREP THROAT: ICD-10-CM

## 2023-03-21 LAB — S PYO AG THROAT QL: NORMAL

## 2023-03-21 PROCEDURE — 99213 OFFICE O/P EST LOW 20 MIN: CPT | Performed by: NURSE PRACTITIONER

## 2023-03-21 PROCEDURE — 87880 STREP A ASSAY W/OPTIC: CPT | Performed by: NURSE PRACTITIONER

## 2023-03-21 ASSESSMENT — ENCOUNTER SYMPTOMS
SORE THROAT: 1
TROUBLE SWALLOWING: 0
CHEST TIGHTNESS: 0
VOMITING: 1
SHORTNESS OF BREATH: 0
NAUSEA: 1
RHINORRHEA: 0
EYE PAIN: 0
COUGH: 0
DIARRHEA: 1

## 2023-03-21 ASSESSMENT — PATIENT HEALTH QUESTIONNAIRE - PHQ9
SUM OF ALL RESPONSES TO PHQ QUESTIONS 1-9: 0
SUM OF ALL RESPONSES TO PHQ QUESTIONS 1-9: 0
1. LITTLE INTEREST OR PLEASURE IN DOING THINGS: 0
SUM OF ALL RESPONSES TO PHQ QUESTIONS 1-9: 0
2. FEELING DOWN, DEPRESSED OR HOPELESS: 0
SUM OF ALL RESPONSES TO PHQ QUESTIONS 1-9: 0
SUM OF ALL RESPONSES TO PHQ9 QUESTIONS 1 & 2: 0

## 2023-03-21 NOTE — PROGRESS NOTES
Capillary Refill: Capillary refill takes less than 2 seconds. Coloration: Skin is not pale. Findings: No rash. Neurological:      Mental Status: He is alert and oriented to person, place, and time. Coordination: Coordination normal.      Gait: Gait normal.   Psychiatric:         Mood and Affect: Mood normal.         Thought Content: Thought content normal.         MEDICAL DECISION MAKING Assessment/Plan:     Sarita Kirby was seen today for nausea & vomiting. Diagnoses and all orders for this visit:    Viral illness    Sore throat  -     POCT rapid strep A    Exposure to strep throat  -     POCT rapid strep A      Results for orders placed or performed in visit on 03/21/23   POCT rapid strep A   Result Value Ref Range    Strep A Ag None Detected None Detected     Rapid strep A in the office today was negative. Based on the history and exam, I suspect this is a viral illness. Recommend increasing fluid intake, rest.   May take Motrin or Tylenol as directed on the bottle for fever, pain. Throat lozenges or salt water gargles for throat pain. Advised patient to call if not improving. Pt to return if symptoms are not improving or worsening. Go to the ER for any emergent concern. Patient given educational materials - see patientinstructions. Discussed use, benefit, and side effects of prescribed medications. All patient questions answered. Pt verbalized understanding. Instructed to continue current medications, diet and exercise. Patient agreed with treatment plan. Follow up as directed.      Electronically signed by ENOC Rogel CNP on 3/21/2023 at 1:38 PM

## 2023-03-21 NOTE — LETTER
March 21, 2023       Emmanuel Connolly YOB: 1995   [address] [place] [id number] Date of Visit:  3/21/2023       To Whom It May Concern:    Emmanuel Connolly was seen in my clinic on 3/21/2023. If you have any questions or concerns, please don't hesitate to call.     Sincerely,        Manju Mcguire, APRN - CNP

## 2023-05-04 ENCOUNTER — OFFICE VISIT (OUTPATIENT)
Dept: PRIMARY CARE CLINIC | Age: 28
End: 2023-05-04
Payer: OTHER GOVERNMENT

## 2023-05-04 VITALS
SYSTOLIC BLOOD PRESSURE: 102 MMHG | HEIGHT: 69 IN | OXYGEN SATURATION: 97 % | BODY MASS INDEX: 34.45 KG/M2 | WEIGHT: 232.6 LBS | DIASTOLIC BLOOD PRESSURE: 74 MMHG | HEART RATE: 82 BPM

## 2023-05-04 DIAGNOSIS — Z79.899 MEDICATION MANAGEMENT: ICD-10-CM

## 2023-05-04 DIAGNOSIS — G47.09 OTHER INSOMNIA: Primary | ICD-10-CM

## 2023-05-04 PROCEDURE — 99213 OFFICE O/P EST LOW 20 MIN: CPT | Performed by: FAMILY MEDICINE

## 2023-05-04 NOTE — PROGRESS NOTES
[facility] PRIMARY CARE  [address] [place] [id number]  Dept: [phone]    Sam Mccartney is a 33 y.o. male Established patient, who presents today for his medical conditions/complaintsas noted below. Chief Complaint   Patient presents with    Anxiety     Pt Is here for a x3 month f/u. Pt reports no concerns. Insomnia       HPI:     HPI  He stopped taking the Lexapro last week. He felt it made him more tired and more difficulty sleeping. When he first stopped it he did have some tremors and he does get lightheaded this week if he gets up too fast.  But he is feeling overall better he feels like he has more energy and is sleeping better. He has not needed any Benadryl to help him sleep. He has been working out and talking with friends and that is helping. He is  and has 3 children. He will be knowing his new assignment in November  Reviewed prior notes None  Reviewed previous Labs    LDL Cholesterol (mg/dL)   Date Value   04/22/2021 114       (goal LDL is <100)   AST (U/L)   Date Value   09/30/2021 19     ALT (U/L)   Date Value   09/30/2021 21     BUN (mg/dL)   Date Value   09/30/2021 18     BP Readings from Last 3 Encounters:   05/04/23 102/74   03/21/23 112/68   02/03/23 120/74          (goal 120/80)    Past Medical History:   Diagnosis Date    Chronic back pain       History reviewed. No pertinent surgical history.     Family History   Problem Relation Age of Onset    Heart Failure Mother     Diabetes Mother     Diabetes Father        Social History     Tobacco Use    Smoking status: Never    Smokeless tobacco: Never   Substance Use Topics    Alcohol use: Not Currently      Current Outpatient Medications   Medication Sig Dispense Refill    escitalopram (LEXAPRO) 20 MG tablet Take 1 tablet by mouth daily (Patient not taking: Reported on 5/4/2023) 30 tablet 2    sodium chloride (ALTAMIST SPRAY) 0.65 % nasal spray 2 sprays by Nasal route in the

## 2023-05-09 ENCOUNTER — PATIENT MESSAGE (OUTPATIENT)
Dept: PRIMARY CARE CLINIC | Age: 28
End: 2023-05-09

## 2023-05-09 DIAGNOSIS — R68.82 LOW LIBIDO: Primary | ICD-10-CM

## 2023-05-09 DIAGNOSIS — F41.9 ANXIETY: Primary | ICD-10-CM

## 2023-05-09 DIAGNOSIS — Z56.6 STRESS AT WORK: ICD-10-CM

## 2023-05-09 DIAGNOSIS — R68.82 LOW LIBIDO: ICD-10-CM

## 2023-05-09 SDOH — HEALTH STABILITY - MENTAL HEALTH: OTHER PHYSICAL AND MENTAL STRAIN RELATED TO WORK: Z56.6

## 2023-05-09 NOTE — TELEPHONE ENCOUNTER
From: David John  To: Dr. Leo Russell: 5/9/2023 10:22 AM EDT  Subject: Therapy     So I think Im ready to take the next step into therapy with a psychologist. Refugio Emerson like to use the counselor in the [facility]. On a side note I wanted to get your thoughts about blood work for Testosterone and Cortisol. Not that I feel like I have low testosterone but I want to get a base line to see where I am at. Thank you for your help in this journey!

## 2023-05-10 ENCOUNTER — OFFICE VISIT (OUTPATIENT)
Dept: BEHAVIORAL/MENTAL HEALTH CLINIC | Age: 28
End: 2023-05-10
Payer: OTHER GOVERNMENT

## 2023-05-10 DIAGNOSIS — F43.9 TRAUMA AND STRESSOR-RELATED DISORDER: Primary | ICD-10-CM

## 2023-05-10 DIAGNOSIS — R68.82 LOW LIBIDO: ICD-10-CM

## 2023-05-10 LAB
SEX HORMONE BINDING GLOBULIN: 26 NMOL/L (ref 11–80)
TESTOSTERONE FREE-NONMALE: 94.6 PG/ML (ref 47–244)
TESTOSTERONE TOTAL: 407 NG/DL (ref 220–1000)
TESTOSTERONE, BIOAVAILABLE: 221.7 NG/DL (ref 130–680)

## 2023-05-10 PROCEDURE — 90791 PSYCH DIAGNOSTIC EVALUATION: CPT | Performed by: COUNSELOR

## 2023-05-10 ASSESSMENT — PATIENT HEALTH QUESTIONNAIRE - PHQ9
9. THOUGHTS THAT YOU WOULD BE BETTER OFF DEAD, OR OF HURTING YOURSELF: 0
3. TROUBLE FALLING OR STAYING ASLEEP: 3
SUM OF ALL RESPONSES TO PHQ QUESTIONS 1-9: 16
4. FEELING TIRED OR HAVING LITTLE ENERGY: 3
1. LITTLE INTEREST OR PLEASURE IN DOING THINGS: 2
5. POOR APPETITE OR OVEREATING: 3
SUM OF ALL RESPONSES TO PHQ QUESTIONS 1-9: 16
SUM OF ALL RESPONSES TO PHQ9 QUESTIONS 1 & 2: 2
6. FEELING BAD ABOUT YOURSELF - OR THAT YOU ARE A FAILURE OR HAVE LET YOURSELF OR YOUR FAMILY DOWN: 2
SUM OF ALL RESPONSES TO PHQ QUESTIONS 1-9: 16
SUM OF ALL RESPONSES TO PHQ QUESTIONS 1-9: 16
2. FEELING DOWN, DEPRESSED OR HOPELESS: 0
8. MOVING OR SPEAKING SO SLOWLY THAT OTHER PEOPLE COULD HAVE NOTICED. OR THE OPPOSITE, BEING SO FIGETY OR RESTLESS THAT YOU HAVE BEEN MOVING AROUND A LOT MORE THAN USUAL: 1
10. IF YOU CHECKED OFF ANY PROBLEMS, HOW DIFFICULT HAVE THESE PROBLEMS MADE IT FOR YOU TO DO YOUR WORK, TAKE CARE OF THINGS AT HOME, OR GET ALONG WITH OTHER PEOPLE: 1
7. TROUBLE CONCENTRATING ON THINGS, SUCH AS READING THE NEWSPAPER OR WATCHING TELEVISION: 2

## 2023-05-10 ASSESSMENT — ANXIETY QUESTIONNAIRES
5. BEING SO RESTLESS THAT IT IS HARD TO SIT STILL: 1
6. BECOMING EASILY ANNOYED OR IRRITABLE: 3
7. FEELING AFRAID AS IF SOMETHING AWFUL MIGHT HAPPEN: 0
2. NOT BEING ABLE TO STOP OR CONTROL WORRYING: 2
4. TROUBLE RELAXING: 2
3. WORRYING TOO MUCH ABOUT DIFFERENT THINGS: 3
1. FEELING NERVOUS, ANXIOUS, OR ON EDGE: 3
GAD7 TOTAL SCORE: 14
IF YOU CHECKED OFF ANY PROBLEMS ON THIS QUESTIONNAIRE, HOW DIFFICULT HAVE THESE PROBLEMS MADE IT FOR YOU TO DO YOUR WORK, TAKE CARE OF THINGS AT HOME, OR GET ALONG WITH OTHER PEOPLE: VERY DIFFICULT

## 2023-05-25 ENCOUNTER — OFFICE VISIT (OUTPATIENT)
Dept: BEHAVIORAL/MENTAL HEALTH CLINIC | Age: 28
End: 2023-05-25
Payer: OTHER GOVERNMENT

## 2023-05-25 DIAGNOSIS — F43.9 TRAUMA AND STRESSOR-RELATED DISORDER: Primary | ICD-10-CM

## 2023-05-25 PROCEDURE — 90837 PSYTX W PT 60 MINUTES: CPT | Performed by: COUNSELOR

## 2023-06-20 ENCOUNTER — OFFICE VISIT (OUTPATIENT)
Dept: BEHAVIORAL/MENTAL HEALTH CLINIC | Age: 28
End: 2023-06-20
Payer: OTHER GOVERNMENT

## 2023-06-20 DIAGNOSIS — F43.9 TRAUMA AND STRESSOR-RELATED DISORDER: Primary | ICD-10-CM

## 2023-06-20 PROCEDURE — 90837 PSYTX W PT 60 MINUTES: CPT | Performed by: COUNSELOR

## 2023-07-07 ENCOUNTER — OFFICE VISIT (OUTPATIENT)
Dept: BEHAVIORAL/MENTAL HEALTH CLINIC | Age: 28
End: 2023-07-07

## 2023-07-07 DIAGNOSIS — F43.9 TRAUMA AND STRESSOR-RELATED DISORDER: Primary | ICD-10-CM

## 2023-07-07 NOTE — PROGRESS NOTES
ADULT BEHAVIORAL HEALTH FOLLOW UP  [facility]      Visit Date: 7/7/2023   Time of appointment:  8:03   Time spent with Patient: 56 minutes. This is patient's fourth appointment. Reason for Consult:  Anxiety     Referring Provider/PCP:    No ref. provider found  Chyna Ashton MD      Pt provided informed consent for the behavioral health program. Discussed with patient model of service to include the limits of confidentiality (i.e. abuse reporting, suicide intervention, etc.) and short-term intervention focused approach. Pt indicated understanding. Saeed Ness is a 33 y.o. male who presents for follow up of Trauma and Stressor Related Disorder. Client identified one verbal outburst over the course of approximately 3 weeks, where previously he had been averaging 3 or more outbursts a week. Client stated that he has been actively monitoring his anger and utilizing the strategies discussed in last session. Therapist praised client for his efforts, and asked about the difference in the way he feels after avoiding an outburst versus after losing emotional control. Client stated that he feels significantly better about his improved control, and believes it is improving the relationships between himself and his wife and children. Therapist encouraged client to continue monitoring for anger cues, and utilizing skills to maintain control. Client and therapist transitioned to discussing other stressors. Client focused on a constant feeling of irritability that easily escalates to anger, feeling constantly on guard, and being easily startled which leads to increased agitation. Therapist reflected that these experiences can be indicative of continued trauma response. Client and therapist discussed possible treatments including EMDR. Client stated he would like to discuss this further at next session.       Previous Recommendations: Client to monitor for cues and utilize developed strategies to assist in

## 2023-07-25 ENCOUNTER — PATIENT MESSAGE (OUTPATIENT)
Dept: PRIMARY CARE CLINIC | Age: 28
End: 2023-07-25

## 2023-07-25 DIAGNOSIS — M51.9 LUMBAR DISC DISEASE: ICD-10-CM

## 2023-07-25 DIAGNOSIS — M51.26 LUMBAR DISC HERNIATION: Primary | ICD-10-CM

## 2023-07-25 NOTE — TELEPHONE ENCOUNTER
Barbara Wilson MA 2023 9:44 AM EDT      ----- Message -----  From: Henry Johnson"  Sent: 2023 8:54 AM EDT  To: Abrahan Ramírez Clinical Staff  Subject: Back issues     I made an appointment to get a referral to see the same orthopedic office I was referred to previously. The referral is  so I am messaging to see if I need to come in to be evaluated or if we can put in the referral via message.

## 2023-08-09 ENCOUNTER — TELEPHONE (OUTPATIENT)
Dept: BEHAVIORAL/MENTAL HEALTH CLINIC | Age: 28
End: 2023-08-09

## 2023-08-14 ENCOUNTER — OFFICE VISIT (OUTPATIENT)
Dept: BEHAVIORAL/MENTAL HEALTH CLINIC | Age: 28
End: 2023-08-14

## 2023-08-14 DIAGNOSIS — F43.9 TRAUMA AND STRESSOR-RELATED DISORDER: Primary | ICD-10-CM

## 2023-08-14 NOTE — PROGRESS NOTES
ADULT BEHAVIORAL HEALTH FOLLOW UP  [facility]      Visit Date: 8/14/2023   Time of appointment:  8:03   Time spent with Patient: 56 minutes. This is patient's fourth appointment. Reason for Consult:  Trauma and Anxiety     Referring Provider/PCP:    No ref. provider found  Larene Holstein, MD      Pt provided informed consent for the behavioral health program. Discussed with patient model of service to include the limits of confidentiality (i.e. abuse reporting, suicide intervention, etc.) and short-term intervention focused approach. Pt indicated understanding. Cecy Berrios is a 33 y.o. male who presents for follow up of Trauma and Stressor Related Disorder. Client reported little change between sessions. Client stated he would like to pursue EMDR treatment. Therapist provided psychoeducation regarding bilateral tapping and the [place] self regulation skill. Client chose a hunting spot from a [employer] as his focus. Therapist guided client through use of slow bilateral tapping and the [employer] skill. Client reported feeling very relaxed after use of this skill, and stated he felt comfortable practicing it between sessions. Therapist encouraged client to use this skill at least once per day to master its use. Previous Recommendations: Client to consider treatment options, and continue to monitor for cues and utilize developed strategies to assist in managing anger. MENTAL STATUS EXAM  Mood was anxious with brightens and anxious affect. Suicidal ideation was denied. Homicidal ideation was denied. Hygiene was good . Dress was appropriate. Behavior was Restless & fidgety with No observation or self-report of difficulties ambulating. Attitude was Cooperative. Eye-contact was fair. Speech: rate - WNL, rhythm - WNL, volume - WNL. Verbalizations were goal directed.   Thought processes were intact and goal-oriented without evidence of delusions, hallucinations,

## 2023-08-17 ENCOUNTER — PATIENT MESSAGE (OUTPATIENT)
Dept: PRIMARY CARE CLINIC | Age: 28
End: 2023-08-17

## 2023-08-17 ENCOUNTER — PATIENT MESSAGE (OUTPATIENT)
Dept: ORTHOPEDIC SURGERY | Age: 28
End: 2023-08-17

## 2023-08-17 ENCOUNTER — OFFICE VISIT (OUTPATIENT)
Dept: ORTHOPEDIC SURGERY | Age: 28
End: 2023-08-17

## 2023-08-17 VITALS — RESPIRATION RATE: 14 BRPM | BODY MASS INDEX: 34.36 KG/M2 | WEIGHT: 232 LBS | HEIGHT: 69 IN

## 2023-08-17 DIAGNOSIS — M54.41 CHRONIC MIDLINE LOW BACK PAIN WITH BILATERAL SCIATICA: Primary | ICD-10-CM

## 2023-08-17 DIAGNOSIS — M54.42 CHRONIC MIDLINE LOW BACK PAIN WITH BILATERAL SCIATICA: Primary | ICD-10-CM

## 2023-08-17 DIAGNOSIS — G89.29 CHRONIC MIDLINE LOW BACK PAIN WITH BILATERAL SCIATICA: Primary | ICD-10-CM

## 2023-08-17 NOTE — PROGRESS NOTES
spine reviewed and taken today showed mild scoliosis of about 5 degrees that is unchanged from previous imaging on 1/20/2022. Patient does have a L4-5 retrolisthesis that is also unchanged since previous imaging. No obvious fractures. Assessment and Plan:  1. Chronic midline low back pain with bilateral sciatica      Farzana Suh is a 33 y.o. old male who presents today for evaluation of low back pain. Pain has been present for 3 years with symptoms of low back and buttock pain. Examination is consistent with chronic midline low back pain. Imaging shows mild scoliosis with an L4-5 retrolisthesis that is unchanged from previous imaging on 1/20/2022. We discussed treatment options available to him, including nonoperative and operative intervention. At this time I believe he will benefit from conservative management. Physical therapy at [facility] ordered. I spoke with patient about potential follow-up requiring an MRI that is recent in our system as I am unable to view prior imaging only the report given by patient and seen in his chart. Prior MRI of lumbar spine from 7/6/20 report is in the chart. 2. Follow up in 6 weeks for reevaluation, he was encouraged to return or call earlier with questions and/or concerns. Albert Pro PA-C    8/19/2023 6:45 PM    Please note that this chart was generated using voice recognition Dragon dictation software. Although every effort was made to ensure the accuracy of this automated transcription, some errors in transcription may have occurred.

## 2023-08-29 ENCOUNTER — HOSPITAL ENCOUNTER (OUTPATIENT)
Dept: PHYSICAL THERAPY | Facility: CLINIC | Age: 28
Setting detail: THERAPIES SERIES
Discharge: HOME | End: 2023-08-29
Payer: OTHER GOVERNMENT

## 2023-08-29 PROCEDURE — 97110 THERAPEUTIC EXERCISES: CPT

## 2023-08-29 PROCEDURE — 97161 PT EVAL LOW COMPLEX 20 MIN: CPT

## 2023-08-29 NOTE — CONSULTS
back. []  []  []      3. Improve score on assessment tool Modified Oswestry from 28% impairment to less than 18% impairment, demonstrating improved tolerance to activity to ease working out recreationally. []  []  []     4. Independent with Home Exercise Programs []  []  []      []  []  []     Date Addressed:        LTG: To be met in 16 treatments       1. Improve score on assessment tool Modified Oswestry from 28% impairment to less than 8% impairment, demonstrating improved tolerance to activity to ease working out recreationally. []  []  []     2. Reduce pain levels to 1/10 or less with activity to help ease working out recreationally. []  []  []     3. ? Strength: Increase deb hip abd/core strength to 4+/5 grossly to help improve trunk stability with activity to ease working out recreationally without using compensations. []  []  []                 Patient goals: \"Stepping stone to learn new stretches and develop core strength\"    Rehab Potential:  [x] Good  [] Fair  [] Poor   Suggested Professional Referral:  [x] No  [] Yes:  Barriers to Goal Achievement[de-identified]  [x] No  [] Yes:  Domestic Concerns:  [x] No  [] Yes:    Pt. Education:  [x] Plans/Goals, Risks/Benefits discussed  [x] Home exercise program  Method of Education: [x] Verbal  [x] Demo  [x] Written  Access Code: [email or web address]  URL: [email or web address]  Date: 08/29/2023  Prepared by: Marcio Home    Exercises  - Supine Lower Trunk Rotation  - 3 x daily - 7 x weekly - 10 reps - 10 seconds hold  - Supine Single Knee to Chest Stretch  - 3 x daily - 7 x weekly - 3 sets - 30 seconds hold  - Supine Transversus Abdominis Bracing - Hands on Stomach  - 2-3 x daily - 7 x weekly - 2 sets - 10 reps - 5 seconds hold  Comprehension of Education:  [x] Verbalizes understanding. [x] Demonstrates understanding. [x] Needs Review. [] Demonstrates/verbalizes understanding of HEP/Ed previously given.     Treatment Plan:  [x] Therapeutic Exercise   [address]  [] Iontophoresis:

## 2023-08-29 NOTE — TELEPHONE ENCOUNTER
[facility] referrals for both Monitor Summary  0.16/0.09/0.41  SR w/ frequent PVCs, some wide complex tachycardia  60-90s

## 2023-08-31 ENCOUNTER — HOSPITAL ENCOUNTER (OUTPATIENT)
Dept: PHYSICAL THERAPY | Facility: CLINIC | Age: 28
Setting detail: THERAPIES SERIES
Discharge: HOME | End: 2023-08-31
Payer: OTHER GOVERNMENT

## 2023-08-31 PROCEDURE — 97110 THERAPEUTIC EXERCISES: CPT

## 2023-08-31 NOTE — FLOWSHEET NOTE
[] [address]  46[address] [facility].  [phone]  F: [phone] [] 204 [facility]  [address]   [address]  P: [phone]  F: [phone] [x] [address]  151 [facility]  P: [phone]  F: [phone] [] [place]: [phone]  F: [phone] [] [address]  [address] [facility]   [address]   P: [phone]  F: [phone]  [] 7[address] [facility].   P: [phone]  F: [phone] [] [address] [facility]  [address] [place]  [address]  P: [phone]  F: [phone] [] 224 [facility]  7[address] [facility]  [place]: [phone]  F: [phone] [] [address]  [place]: [phone]  F: [phone]      Physical Therapy Daily Treatment Note    Date:  2023  Patient Name:  Elvira Qureshi    :  1995  MRN: [id number]  Physician: ANGELA Reid                              Insurance: [employer] (15/15 Visits Approved, [employer] AFTER )  Medical Diagnosis: M54.41, M54.42, G89.29 (ICD-10-CM) - Chronic midline low back pain with bilateral sciatica        Rehab Codes: M54.5, R53.1  Onset Date: 23                           Next 's appt.: 23    Visit# / total visits:      Cancels/No Shows: 0/0    Subjective:  \"Darron\"  Pain:  [] Yes  [x] No Location: LB   Pain Rating: (0-10 scale) 0/10  Pain altered Tx:  [] No  [] Yes  Action:  Comments: Pt reports no pain on arrival or issues after IE.  He stated just \"stiff\" this

## 2023-09-06 ENCOUNTER — HOSPITAL ENCOUNTER (OUTPATIENT)
Dept: PHYSICAL THERAPY | Facility: CLINIC | Age: 28
Setting detail: THERAPIES SERIES
Discharge: HOME | End: 2023-09-06
Payer: OTHER GOVERNMENT

## 2023-09-06 PROCEDURE — 97110 THERAPEUTIC EXERCISES: CPT

## 2023-09-06 NOTE — FLOWSHEET NOTE
[] [address]  4600 [facility].  [phone]  F: [phone] [] 204 [facility]  [address]   [address]  P: [phone]  F: [phone] [x] [address]  151 [facility]  P: [phone]  F: [phone] [] [facility] [place]: [phone]  F: [phone] [] 224 [facility]  One [facility]   [address]   P: [phone]  F: [phone]  [] 0248 [facility].   P: [phone]  F: [phone] [] [address]  [address]  P: [phone]  F: [phone] [] 224 [facility]  [address] [facility]  [place]: [phone]  F: [phone] [] [address]  [place]: [phone]  F: [phone]      Physical Therapy Daily Treatment Note    Date:  2023  Patient Name:  Yulisa Davis    :  1995  MRN: [id number]  Physician: ANGELA Ott                              Insurance: [id number] (15/15 Visits Approved, Naomi Beltrán AFTER )  Medical Diagnosis: M54.41, M54.42, G89.29 (ICD-10-CM) - Chronic midline low back pain with bilateral sciatica        Rehab Codes: M54.5, R53.1  Onset Date: 23                           Next 's appt.: 23    Visit# / total visits: 3/16     Cancels/No Shows: 0/0    Subjective:  \"Darron\"  Pain:  [] Yes  [x] No Location: LB   Pain Rating: (0-10 scale) \"minimal\"/10  Pain altered Tx:  [] No  [] Yes  Action:  Comments: Pt states very minimal pain at arrival. No issues with HEP.

## 2023-09-08 ENCOUNTER — HOSPITAL ENCOUNTER (OUTPATIENT)
Dept: PHYSICAL THERAPY | Facility: CLINIC | Age: 28
Setting detail: THERAPIES SERIES
Discharge: HOME | End: 2023-09-08
Payer: OTHER GOVERNMENT

## 2023-09-08 PROCEDURE — 97110 THERAPEUTIC EXERCISES: CPT

## 2023-09-08 NOTE — FLOWSHEET NOTE
[] [address]  4600 [facility] [place] [facility].  [phone]  F: [phone] [] 204 [facility]  [address]  P: [phone]  F: [phone] [x] [address]  151 [facility]  P: [phone]  F: [phone] [] [place]: [phone]  F: [phone] [] 224 [facility]  One [facility]   [address]   P: [phone]  F: [phone]  [] 8[address] [facility].   P: [phone]  F: [phone] [] 205 [facility]  [address] [place]  [address]  P: [phone]  F: [phone] [] 224 [facility]  [address] [place] [address]  [place]: [phone]  F: [phone] [] [address]  [place]: [phone]  F: [phone]      Physical Therapy Daily Treatment Note    Date:  2023  Patient Name:  Joshua Press    :  1995  MRN: [id number]  Physician: ANGELA Walters                              Insurance: [employer] (15/15 Visits Approved, AUTH AFTER ) 8/15/23 - 23  Medical Diagnosis: M54.41, M54.42, G89.29 (ICD-10-CM) - Chronic midline low back pain with bilateral sciatica        Rehab Codes: M54.5, R53.1  Onset Date: 23                           Next 's appt.: 23    Visit# / total visits:      Cancels/No Shows: 0/0    Subjective:  \"Darron\"  Pain:  [] Yes  [x] No Location: LB   Pain Rating: (0-10 scale) 0/10  Pain altered Tx:  [] No  [] Yes  Action:  Comments: Sore following last treatment.  No pain today just some

## 2023-09-19 ENCOUNTER — HOSPITAL ENCOUNTER (OUTPATIENT)
Dept: PHYSICAL THERAPY | Facility: CLINIC | Age: 28
Setting detail: THERAPIES SERIES
Discharge: HOME | End: 2023-09-19
Payer: OTHER GOVERNMENT

## 2023-09-19 PROCEDURE — 97110 THERAPEUTIC EXERCISES: CPT

## 2023-09-19 NOTE — FLOWSHEET NOTE
[] [address]  4600 [facility] [place].  [phone]  F: [phone] [] 204 [facility]  [address]   [address]  P: [phone]  F: [phone] [x] [address]  151 [facility]  P: [phone]  F: [phone] [] [facility]: [phone]  F: [phone] [] [address]  One [facility]   [address]   P: [phone]  F: [phone]  [] 57[address] [facility].   P: [phone]  F: [phone] [] 205 [facility]  [address] [facility] [address]   [address]  P: [phone]  F: [phone] [] 224 [facility]  795 [facility]  [place]: [phone]  F: [phone] [] [address] [facility] [address]  [place]: [phone]  F: [phone]      Physical Therapy Daily Treatment Note    Date:  2023  Patient Name:  Farzana Suh    :  1995  MRN: [id number]  Physician: ANGELA Melendez                              Insurance: [employer] (15/15 Visits Approved, AUTH AFTER ) 8/15/23 - 23  Medical Diagnosis: M54.41, M54.42, G89.29 (ICD-10-CM) - Chronic midline low back pain with bilateral sciatica        Rehab Codes: M54.5, R53.1  Onset Date: 23                           Next 's appt.: 23    Visit# / total visits:      Cancels/No Shows: 0/0    Subjective:  \"Darron\"  Pain:  [] Yes  [x] No Location: LB   Pain Rating: (0-10 scale) 0/10  Pain altered Tx:  [] No  [] Yes  Action:  Comments: Pt states he is getting over a cold which is why he was

## 2023-09-21 ENCOUNTER — HOSPITAL ENCOUNTER (OUTPATIENT)
Dept: PHYSICAL THERAPY | Facility: CLINIC | Age: 28
Setting detail: THERAPIES SERIES
Discharge: HOME | End: 2023-09-21
Payer: OTHER GOVERNMENT

## 2023-09-21 PROCEDURE — 97110 THERAPEUTIC EXERCISES: CPT

## 2023-09-21 PROCEDURE — 97140 MANUAL THERAPY 1/> REGIONS: CPT

## 2023-09-21 NOTE — FLOWSHEET NOTE
[] [address]  4600 [facility].  [phone]  F: [phone] [] 204 [facility]  [address]   [address]  P: [phone]  F: [phone] [x] [address]  151 [facility] [place] [facility]  P: [phone]  F: [phone] [] [place]: [phone]  F: [phone] [] [address]  One [facility]   [address]   P: [phone]  F: [phone]  [] 7170 [facility].   P: [phone]  F: [phone] [] 205 [facility]  [address] [place]    [address]  P: [phone]  F: [phone] [] 224 [facility]  795 [facility]  [place]: [phone]  F: [phone] [] [address]  [place]: [phone]  F: [phone]      Physical Therapy Daily Treatment Note    Date:  2023  Patient Name:  Serena Ames    :  1995  MRN: [id number]  Physician: ANGELA Pinedo                              Insurance: [employer] (15/15 Visits Approved, AUTH AFTER ) 8/15/23 - 23  Medical Diagnosis: M54.41, M54.42, G89.29 (ICD-10-CM) - Chronic midline low back pain with bilateral sciatica        Rehab Codes: M54.5, R53.1  Onset Date: 23                           Next 's appt.: 23    Visit# / total visits:      Cancels/No Shows: 0/0    Subjective:  \"Darron\"  Pain:  [] Yes  [x] No Location: LB   Pain Rating: (0-10 scale) 0/10  Pain altered Tx:  [] No  [] Yes  Action:  Comments: He mentions no pain at arrival. Feeling tight from long car

## 2023-09-26 ENCOUNTER — HOSPITAL ENCOUNTER (OUTPATIENT)
Dept: PHYSICAL THERAPY | Facility: CLINIC | Age: 28
Setting detail: THERAPIES SERIES
Discharge: HOME | End: 2023-09-26
Payer: OTHER GOVERNMENT

## 2023-09-26 PROCEDURE — 97110 THERAPEUTIC EXERCISES: CPT

## 2023-09-26 NOTE — FLOWSHEET NOTE
flexible. Mentions continued LBP when sitting or standing for extended period of time. Objective:  Modalities:   Precautions: Standard   Exercise  Low Back Pain Reps/ Time Weight/ Level Comments    Elliptical 10'   x              HS step S  3x30\"     x   SB S  3x30\"     x   Step Hip flexor stretch 3x30\"     x   LTR 10x10\"     x   SKTC S 3x30\"    opposite leg straight x   Piriformis S 3x30\"     x              Dead bugs w/SB 2x10 Red SB  x   SL bridge 2x10   x   Clamshells  3x10 blue   -   SL hip abduction 3x10 blue  -   Prone Hip ext 3x10  2 pillows, knee bent  -   Palloff press 2x10  green   x   Paloff twist 2x10 green  x   Prone on SB: alt UE, LE, UE/LE X20 ea   Green SB x   3 way hip  X10 B Blue loop  x   Tband sidestepping 2L blue  x              Manual: hypervolt B lumbar paraspinals, B posterolateral hips     Specific Instructions for next treatment: cont to progress flexibility and strength    Treatment Charges: Mins Units   []  Modalities     [x]  Ther Exercise 55 4   [x]  Manual Therapy 5 nc   []  Ther Activities     []  Neuro Re-ed     []  Vasocompression     [] Gait     []  Other     Total Treatment time 60 4       Assessment: [x] Progressing toward goals. Cont with elliptical for a warm up followed by review of stretches. Cont focusing on flexibility as well as lumbar,core, and hip strength. Progressed his program with increased tband resistance across program. Min cues for technique. No increase in pain or radicular sx with progression through treatment. Ended with hypervolt to B posterolateral hips and lumbar paraspinals for tension relief. [] No change. [] Other:  [x] Patient would continue to benefit from skilled physical therapy services in order to:  Improve deb LE strength, improve deb LE/low back flexibility, improve lumbar ROM, reduce pain in back, reduce symptoms down L LE, and improve tolerance to all activity to help allow pt to return to all running and working out

## 2023-09-28 ENCOUNTER — OFFICE VISIT (OUTPATIENT)
Dept: ORTHOPEDIC SURGERY | Age: 28
End: 2023-09-28
Payer: OTHER GOVERNMENT

## 2023-09-28 ENCOUNTER — PATIENT MESSAGE (OUTPATIENT)
Dept: PRIMARY CARE CLINIC | Age: 28
End: 2023-09-28

## 2023-09-28 ENCOUNTER — TELEPHONE (OUTPATIENT)
Dept: PRIMARY CARE CLINIC | Age: 28
End: 2023-09-28

## 2023-09-28 VITALS — BODY MASS INDEX: 34.36 KG/M2 | RESPIRATION RATE: 16 BRPM | WEIGHT: 232 LBS | HEIGHT: 69 IN

## 2023-09-28 DIAGNOSIS — G89.29 CHRONIC MIDLINE LOW BACK PAIN WITH BILATERAL SCIATICA: Primary | ICD-10-CM

## 2023-09-28 DIAGNOSIS — M54.42 CHRONIC MIDLINE LOW BACK PAIN WITH BILATERAL SCIATICA: Primary | ICD-10-CM

## 2023-09-28 DIAGNOSIS — M54.41 CHRONIC MIDLINE LOW BACK PAIN WITH BILATERAL SCIATICA: Primary | ICD-10-CM

## 2023-09-28 PROCEDURE — 99213 OFFICE O/P EST LOW 20 MIN: CPT | Performed by: PHYSICIAN ASSISTANT

## 2023-09-28 NOTE — TELEPHONE ENCOUNTER
Emilia Mac [place] 9/28/2023 12:07 PM EDT      ----- Message -----  From: Cassandra Horner \"Darron\"  Sent: 9/28/2023 10:02 AM EDT  To: Charles Ramírez Clinical Staff  Subject: MRI authorization     Hello! Dr Latoya Bustamante ordered an MRI for my back. I called to schedule and the said I had a $150 deductible. The orthopedic office is adamant about them not needing prior authorization through [address] [place] [address]. Is there anyway you could facilitate the [facility] authorization? It is the [place] MRI facility on On license of [facility] scheduled for Oct 4th.

## 2023-09-28 NOTE — PROGRESS NOTES
muscle groups tested. Bilateral Lower Extremities: Inspection/examination of the bilateral lower extremity does not show any evidence of atrophy, malalignment, ecchymosis, erythema, scarring, effusions, tenderness, deformity, or injury. Range of motion is intact bilaterally. There is no gross instability. Strength and tone are normal.    Diagnostic imaging:    No new imaging taken today however I reviewed previous x-rays from 8/17/2023 that did show mild scoliosis as well as an L4-L5 retrolisthesis that is unchanged from his previous imaging with no evidence of acute fractures. Assessment and Plan:  1. Chronic midline low back pain with bilateral sciatica      Patient with with midline beltline low back pain with some radicular symptoms upon aggravation of running and long periods of standing for the past 3 years. Recently had multiple visits to physical therapy that has not improved his symptoms that much. At this time we will reevaluate his symptoms and order an MRI of lumbar spine to further delineate diagnosis. Follow up after MRI of lumbar spine            Omar Crisostomo PA-C    9/28/2023 1:26 PM    Please note that this chart was generated using voice recognition Dragon dictation software. Although every effort was made to ensure the accuracy of this automated transcription, some errors in transcription may have occurred.

## 2023-09-28 NOTE — TELEPHONE ENCOUNTER
A deductible means the test is covered but patient has a portion to pay  I can try having my staff put in a  referral and see what happens   MRI lumbar : Dx  lumbar disc herniation  chronic mid line low back pain with sciatica   Facility MRI in [place] on [facility]

## 2023-09-28 NOTE — TELEPHONE ENCOUNTER
Pt called stating he has  insurace and has an MRI lumbar spine scheduled on 10/4/23 and it needs a pre certification.      Please do pre-cert to  & notify pt

## 2023-09-29 ENCOUNTER — APPOINTMENT (OUTPATIENT)
Dept: PHYSICAL THERAPY | Facility: CLINIC | Age: 28
End: 2023-09-29
Payer: OTHER GOVERNMENT

## 2023-10-03 ENCOUNTER — HOSPITAL ENCOUNTER (OUTPATIENT)
Dept: PHYSICAL THERAPY | Facility: CLINIC | Age: 28
Setting detail: THERAPIES SERIES
Discharge: HOME | End: 2023-10-03
Payer: OTHER GOVERNMENT

## 2023-10-03 PROCEDURE — 97140 MANUAL THERAPY 1/> REGIONS: CPT

## 2023-10-03 PROCEDURE — 97110 THERAPEUTIC EXERCISES: CPT

## 2023-10-03 NOTE — FLOWSHEET NOTE
[] [address]  4600 [facility].  [phone]  F: [phone] [] 204 [facility]  [address]   [address]  P: [phone]  F: [phone] [x] [address]  151 [facility]  P: [phone]  F: [phone] [] [place]: [phone]  F: [phone] [] 224 [facility]  One [facility]   [address]   P: [phone]  F: [phone]  [] 77[address] [facility].   P: [phone]  F: [phone] [] 205 [facility]  [address] [facility]   [address]  P: [phone]  F: [phone] [] 224 [facility]  795 [facility]  [place]: [phone]  F: [phone] [] [address] [facility] [address]  [place]: [phone]  F: [phone]      Physical Therapy Daily Treatment Note    Date:  10/3/2023  Patient Name:  Katelynn Hartley    :  1995  MRN: [id number]  Physician: ANGELA Koehler                              Insurance: [employer] (15/15 Visits Approved, AUTH AFTER ) 8/15/23 - 23  Medical Diagnosis: M54.41, M54.42, G89.29 (ICD-10-CM) - Chronic midline low back pain with bilateral sciatica        Rehab Codes: M54.5, R53.1  Onset Date: 23                           Next 's appt.: MRI 10/4    Visit# / total visits:      Cancels/No Shows: 0/0    Subjective:  \"Darron\"  Pain:  [] Yes  [x] No Location: LB   Pain Rating: (0-10 scale) \"did not rate\"/10  Pain altered Tx:  [] No  [] Yes  Action:  Comments: Pt states he had been feeling good up until

## 2023-10-04 ENCOUNTER — HOSPITAL ENCOUNTER (OUTPATIENT)
Dept: MRI IMAGING | Facility: CLINIC | Age: 28
Discharge: HOME | End: 2023-10-06
Payer: OTHER GOVERNMENT

## 2023-10-04 DIAGNOSIS — G89.29 CHRONIC MIDLINE LOW BACK PAIN WITH BILATERAL SCIATICA: ICD-10-CM

## 2023-10-04 DIAGNOSIS — M54.42 CHRONIC MIDLINE LOW BACK PAIN WITH BILATERAL SCIATICA: ICD-10-CM

## 2023-10-04 DIAGNOSIS — M54.41 CHRONIC MIDLINE LOW BACK PAIN WITH BILATERAL SCIATICA: ICD-10-CM

## 2023-10-04 PROCEDURE — 72148 MRI LUMBAR SPINE W/O DYE: CPT

## 2023-10-05 ENCOUNTER — HOSPITAL ENCOUNTER (OUTPATIENT)
Dept: PHYSICAL THERAPY | Facility: CLINIC | Age: 28
Setting detail: THERAPIES SERIES
Discharge: HOME | End: 2023-10-05
Payer: OTHER GOVERNMENT

## 2023-10-05 PROCEDURE — 97140 MANUAL THERAPY 1/> REGIONS: CPT

## 2023-10-05 PROCEDURE — 97110 THERAPEUTIC EXERCISES: CPT

## 2023-10-05 NOTE — FLOWSHEET NOTE
[] [address]  4600 [facility] [place] [facility].  [phone]  F: [phone] [] 204 [facility]  [address]   [address]  P: [phone]  F: [phone] [x] [address]  [address] [facility]  P: [phone]  F: [phone] [] [place]: [phone]  F: [phone] [] 224 [facility]  [address] [facility]   [address]   P: [phone]  F: [phone]  [] 7[address] [facility].   P: [phone]  F: [phone] [] 205 [facility]  [address]  P: [phone]  F: [phone] [] 224 [facility]  [address] [facility]  [place]: [phone]  F: [phone] [] [address]  [place]: [phone]  F: [phone]      Physical Therapy Daily Treatment Note    Date:  10/5/2023  Patient Name:  John Quiñonez    :  1995  MRN: [id number]  Physician: ANGELA Lawrence                              Insurance: [employer] (15/15 Visits Approved, AUTH AFTER ) 8/15/23 - 23  Medical Diagnosis: M54.41, M54.42, G89.29 (ICD-10-CM) - Chronic midline low back pain with bilateral sciatica        Rehab Codes: M54.5, R53.1  Onset Date: 23                           Next 's appt.: 10/19    Visit# / total visits:      Cancels/No Shows: 0/0    Subjective:  \"Darron\"  Pain:  [] Yes  [x] No Location: LB   Pain Rating: (0-10 scale) 0/10  Pain altered Tx:  [] No  [] Yes  Action:  Comments: No current radicular sx down the leg.  Feeling tight in his

## 2023-10-10 ENCOUNTER — HOSPITAL ENCOUNTER (OUTPATIENT)
Dept: PHYSICAL THERAPY | Facility: CLINIC | Age: 28
Setting detail: THERAPIES SERIES
Discharge: HOME | End: 2023-10-10
Payer: OTHER GOVERNMENT

## 2023-10-10 NOTE — CARE COORDINATION
[] [address]  4600 [facility].    [phone]  F: [phone]   [] [address]  [address]   [address]  P: [phone]  F: [phone]  [x] [address]  [address]  P: [phone]  F: [phone] [] [place]  P: [phone]  F: [phone]  [] [address] [facility]  [address]   [place]: [phone]  F: [phone]   [] 97 [facility]  [address]  [place]: [phone]   F: [phone]     Physical Therapy Cancel/No Show note    Date: 10/10/2023  Patient: Arjun Rae  : 1995  MRN: [id number]    Cancels/No Shows to date:     For today's appointment patient:    [x]  Cancelled    [] Rescheduled appointment    [] No-show     Reason given by patient:    [x]  Patient ill    []  Conflicting appointment    [] No transportation      [] Conflict with work    [] No reason given    [] Weather related    [] [id number]    [] Other:      Comments:        [x] Next appointment was confirmed    Electronically signed by: Oriana Whalen PTA

## 2023-10-16 ENCOUNTER — HOSPITAL ENCOUNTER (OUTPATIENT)
Dept: PHYSICAL THERAPY | Facility: CLINIC | Age: 28
Setting detail: THERAPIES SERIES
Discharge: HOME | End: 2023-10-16
Payer: OTHER GOVERNMENT

## 2023-10-16 PROCEDURE — 97110 THERAPEUTIC EXERCISES: CPT

## 2023-10-16 NOTE — PROGRESS NOTES
leg going numb with running anything longer than a mile, he reports that his back is hurting him a little more at this time, and he is still having tightness in his low back. Pt noting that he had an MRI completed which showed circumferential disc bulge from L2-S1, so planning to alter POC and complete more of an extension program to see if this helps reduce symptoms, pt in agreement with plan. Focus on extension program at this time. [] No change. [] Other:  [x] Patient would continue to benefit from skilled physical therapy services in order to: Improve deb LE strength, improve deb LE/low back flexibility, improve lumbar ROM, reduce pain in back, reduce symptoms down L LE, and improve tolerance to all activity to help allow pt to return to all running and working out recreationally. Goals  MET NOT MET ON-  GOING  Details   Date Addressed: 10/16/23           STG: To be met in 8 treatments            1. ? Pain: Decrease pain levels to 4/10 with activity to help ease working out recreationally. []  []  [x]   Still up to 7-8/10   2. ? ROM: Increase flexibility in deb LEs and low back to help improve lumbar flexion to WNL to reduce risk for compensations with bending while working out, leading to increased symptoms in back. []  []  [x]  Flexion: 90% limited d/t pain, able to go further but painful  R rotation: Limited 75%    3. Improve score on assessment tool Modified Oswestry from 28% impairment to less than 18% impairment, demonstrating improved tolerance to activity to ease working out recreationally. []  []  [x]  Pt scored 34% impaired    4. Independent with Home Exercise Programs [x]  []  []        []  []  []      Date Addressed:            LTG: To be met in 16 treatments           1. Improve score on assessment tool Modified Oswestry from 28% impairment to less than 8% impairment, demonstrating improved tolerance to activity to ease working out recreationally. []  []  []      2.  Reduce pain levels to

## 2023-10-19 ENCOUNTER — OFFICE VISIT (OUTPATIENT)
Dept: ORTHOPEDIC SURGERY | Age: 28
End: 2023-10-19
Payer: OTHER GOVERNMENT

## 2023-10-19 VITALS — WEIGHT: 230 LBS | HEIGHT: 69 IN | RESPIRATION RATE: 16 BRPM | BODY MASS INDEX: 34.07 KG/M2

## 2023-10-19 DIAGNOSIS — M54.41 CHRONIC MIDLINE LOW BACK PAIN WITH BILATERAL SCIATICA: Primary | ICD-10-CM

## 2023-10-19 DIAGNOSIS — M54.42 CHRONIC MIDLINE LOW BACK PAIN WITH BILATERAL SCIATICA: Primary | ICD-10-CM

## 2023-10-19 DIAGNOSIS — G89.29 CHRONIC MIDLINE LOW BACK PAIN WITH BILATERAL SCIATICA: Primary | ICD-10-CM

## 2023-10-19 PROCEDURE — 99213 OFFICE O/P EST LOW 20 MIN: CPT | Performed by: PHYSICIAN ASSISTANT

## 2023-10-19 RX ORDER — PREDNISONE 10 MG/1
TABLET ORAL
Qty: 15 TABLET | Refills: 0 | Status: SHIPPED | OUTPATIENT
Start: 2023-10-19 | End: 2023-10-29

## 2023-10-19 NOTE — PROGRESS NOTES
Patient ID: Patrick Garcia is a 33 y.o. male    Chief Compliant:  Chief Complaint   Patient presents with    Lower Back Pain     MRI lumbar results      HPI:  This is a 33 y.o. male who presents to the clinic today for follow up of MRI results of lumbar spine. See previous office note for more details. Patient states that physical therapy did help with his strength and mild relief however continues to have midline low back pain. Patient continues to report that long periods of standing and running has become more painful as he does develop radicular symptoms down his legs left greater than right. Patient is very physically active in the [employer]. Physical assessments have become more painful and he has been on limitation with the [employer] secondary to pain. Review of Systems   All other systems reviewed and are negative. Past History:  No current outpatient medications on file.   Allergies   Allergen Reactions    Lexapro [Escitalopram] Other (See Comments)     20 mg dose: worse insomnia and less energy     Social History     Socioeconomic History    Marital status:      Spouse name: Not on file    Number of children: Not on file    Years of education: Not on file    Highest education level: Not on file   Occupational History    Not on file   Tobacco Use    Smoking status: Never    Smokeless tobacco: Never   Vaping Use    Vaping Use: Never used   Substance and Sexual Activity    Alcohol use: Not Currently    Drug use: Never    Sexual activity: Yes     Partners: Female   Other Topics Concern    Not on file   Social History Narrative    Not on file     Social Determinants of Health     Financial Resource Strain: Low Risk  (2/3/2023)    Overall Financial Resource Strain (CARDIA)     Difficulty of Paying Living Expenses: Not hard at all   Food Insecurity: No Food Insecurity (2/3/2023)    Hunger Vital Sign     Worried About Running Out of Food in the Last Year: Never true     [address] in the Last

## 2023-10-23 ENCOUNTER — OFFICE VISIT (OUTPATIENT)
Dept: PRIMARY CARE CLINIC | Age: 28
End: 2023-10-23
Payer: OTHER GOVERNMENT

## 2023-10-23 VITALS
HEART RATE: 77 BPM | WEIGHT: 233.8 LBS | DIASTOLIC BLOOD PRESSURE: 68 MMHG | HEIGHT: 69 IN | OXYGEN SATURATION: 99 % | SYSTOLIC BLOOD PRESSURE: 122 MMHG | BODY MASS INDEX: 34.63 KG/M2

## 2023-10-23 DIAGNOSIS — R06.83 LOUD SNORING: Primary | ICD-10-CM

## 2023-10-23 PROCEDURE — 99212 OFFICE O/P EST SF 10 MIN: CPT | Performed by: STUDENT IN AN ORGANIZED HEALTH CARE EDUCATION/TRAINING PROGRAM

## 2023-10-23 ASSESSMENT — ENCOUNTER SYMPTOMS
APNEA: 1
NAUSEA: 0
SHORTNESS OF BREATH: 0
VOMITING: 0
DIARRHEA: 0
ABDOMINAL PAIN: 0

## 2023-10-24 ENCOUNTER — HOSPITAL ENCOUNTER (OUTPATIENT)
Dept: PHYSICAL THERAPY | Facility: CLINIC | Age: 28
Setting detail: THERAPIES SERIES
Discharge: HOME | End: 2023-10-24
Payer: OTHER GOVERNMENT

## 2023-10-24 PROCEDURE — 97110 THERAPEUTIC EXERCISES: CPT

## 2023-10-24 NOTE — FLOWSHEET NOTE
strength\"      Pt. Education:  [x] Yes  [] No  [x] Reviewed Prior HEP/Ed  Method of Education: [x] Verbal  [x] Demo  [x] Written    Access Code: [email or web address]  URL: [email or web address]  Date: 10/24/2023  Prepared by: Monse Cabrera    Exercises  - Lying Prone  - 3 x daily - 7 x weekly - 3 minutes hold  - Static Prone on Elbows  - 3 x daily - 7 x weekly - 3 minutes hold  - Prone Press Up  - 3 x daily - 7 x weekly - 10 reps - 5 seconds hold  - Superman on Table  - 1 x daily - 7 x weekly - 3 sets - 10 reps  - Prone Hip Extension with Bent Knee  - 1 x daily - 7 x weekly - 3 sets - 10 reps  - Bird Dog  - 1 x daily - 7 x weekly - 3 sets - 10 reps    Comprehension of Education:  [x] Verbalizes understanding. [x] Demonstrates understanding. [x] Needs review. [] Demonstrates/verbalizes HEP/Ed previously given. Plan: [x] Continue current frequency toward long and short term goals.     [x] Specific Instructions for subsequent treatments: Focus on extension program      Time In: 3:00 pm         Time Out: 4:00 pm    Electronically signed by:  Monse Cabrera PTA

## 2023-10-26 ENCOUNTER — HOSPITAL ENCOUNTER (OUTPATIENT)
Dept: PHYSICAL THERAPY | Facility: CLINIC | Age: 28
Setting detail: THERAPIES SERIES
End: 2023-10-26
Payer: OTHER GOVERNMENT

## 2023-10-27 ENCOUNTER — INITIAL CONSULT (OUTPATIENT)
Dept: PAIN MANAGEMENT | Age: 28
End: 2023-10-27
Payer: OTHER GOVERNMENT

## 2023-10-27 VITALS — BODY MASS INDEX: 34.72 KG/M2 | HEIGHT: 69 IN | WEIGHT: 234.4 LBS

## 2023-10-27 DIAGNOSIS — M47.817 LUMBOSACRAL SPONDYLOSIS WITHOUT MYELOPATHY: ICD-10-CM

## 2023-10-27 DIAGNOSIS — M51.36 DEGENERATION OF LUMBAR INTERVERTEBRAL DISC: Primary | ICD-10-CM

## 2023-10-27 PROCEDURE — 99204 OFFICE O/P NEW MOD 45 MIN: CPT | Performed by: PAIN MEDICINE

## 2023-10-27 ASSESSMENT — ENCOUNTER SYMPTOMS
BACK PAIN: 1
BOWEL INCONTINENCE: 0

## 2023-10-27 NOTE — PROGRESS NOTES
AGT PVRT FACET JT LMBR/SAC 1 LEVEL    UT NJX DX/THER AGT PVRT FACET JT LMBR/SAC 2ND LEVEL       Assessment:  1. Degeneration of lumbar intervertebral disc    2. Lumbosacral spondylosis without myelopathy        Treatment Plan:  DISCUSSION: Treatment options discussed with patient and all questions answered to patient's satisfaction. Risks, benefits, alternatives of treatment discussed with patient. OARRS Review: Reviewed and acceptable for medications prescribed. TREATMENT OPTIONS:     Discussed different treatment options including continued conservative care such as physical therapy, chiropractic care, acupuncture. Discussed different interventional options such as epidural steroids or medial branch blocks. Also discussed surgical evaluation. Wishes to consider injections. Has failed conservative options, significant axial low back pain with degenerative changes on imaging,  candidate for diagnostic lumbar medial branch block at bilateral L4-5 and L5-S1 under fluoroscopy to see if pain is facet mediated, if this is beneficial would be a candidate for radiofrequency ablation. Consider epidural steroids. Robina Farfan M.D. I have reviewed the chief complaint and history of present illness (including ROS and PFSH) and vital documentation by my staff and I agree with their documentation and have added where applicable.

## 2023-10-31 ENCOUNTER — HOSPITAL ENCOUNTER (OUTPATIENT)
Dept: PHYSICAL THERAPY | Facility: CLINIC | Age: 28
Setting detail: THERAPIES SERIES
Discharge: HOME | End: 2023-10-31
Payer: OTHER GOVERNMENT

## 2023-10-31 PROCEDURE — 97110 THERAPEUTIC EXERCISES: CPT

## 2023-10-31 NOTE — FLOWSHEET NOTE
prone ex have made a difference in reducing his sx, especially towards the end of the day. Saw pain management, he is scheduled for 2 nerve block injections then an ablation. Objective:  Modalities:   Precautions: Standard   Exercise  Low Back Pain Reps/ Time Weight/ Level Comments    Elliptical 10'   x              HS step S  3x30\"     x   SB S  3x30\"     x   Step Hip flexor stretch 3x30\"     x   LTR 10x10\"        SKTC S 3x30\"    opposite leg straight    Piriformis S 3x30\"     x              Prone lying 3'   -   Prone prop 5'   x   Prone press ups 2x10   x   Prone hip extension  3x10  2 pillows, knees bent x   Prone supermans  3x10      Standing lumbar extension 2x10   x          Dead bugs w/SB 2x10 Red SB     SL bridge 2x10      Clamshells  3x10 blue      SL hip abduction 3x10 blue            Palloff press 2x12 Green    x   Palloff press raises 2x10 Green   x   Paloff twist 2x10 green     Prone on SB: alt UE, LE, UE/LE X20 ea   Green SB x   3 way hip  X10 B Blue loop     Tband sidestepping 2L blue      Bird dogs 2x10      -          Other: MET for SIJ alignment - PRN    Specific Instructions for next treatment: Focus on extension program      Treatment Charges: Mins Units   []  Modalities     [x]  Ther Exercise 45 3   []  Manual Therapy     []  Ther Activities     []  Neuro Re-ed     []  Vasocompression     [] Gait     []  Other     Total Treatment time 45 3       Assessment: [x] Progressing toward goals. Initiated session with elliptical for a warm up followed by stretches for hip and lumbar flexibility. Treatment focused on an extension bias with good tolerance. Increased time for prone prop and reps for prone press ups. No increase in pain or radicular sx with progression through treatment. [] No change. [] Other:  [x] Patient would continue to benefit from skilled physical therapy services in order to:  Improve deb LE strength, improve deb LE/low back flexibility, improve lumbar ROM, reduce pain in

## 2023-11-02 ENCOUNTER — HOSPITAL ENCOUNTER (OUTPATIENT)
Dept: PHYSICAL THERAPY | Facility: CLINIC | Age: 28
Setting detail: THERAPIES SERIES
Discharge: HOME | End: 2023-11-02
Payer: OTHER GOVERNMENT

## 2023-11-02 PROCEDURE — 97110 THERAPEUTIC EXERCISES: CPT

## 2023-11-02 NOTE — FLOWSHEET NOTE
[] [address]  4600 [facility] [place] [facility].  [phone]  F: [phone] [] 204 [facility]  [address]   [address]  P: [phone]  F: [phone] [x] [address]  151 [facility]  P: [phone]  F: [phone] [] [place]: [phone]  F: [phone] [] [address] [facility]  [address] [facility]   [address]   P: [phone]  F: [phone]  [] 355[address] [facility].   P: [phone]  F: [phone] [] 205 [facility]  [address] [facility]   [address]  P: [phone]  F: [phone] [] 224 [facility]  [address] [facility]  [place]: [phone]  F: [phone] [] [address] [facility] [address]  [place]: [phone]  F: [phone]      Physical Therapy Daily Treatment Note    Date:  2023  Patient Name:  Migdalia Saldivar    :  1995  MRN: [id number]  Physician: ANGELA Reich                              Insurance: [employer] (15/15 Visits Approved, AUTH AFTER ) 8/15/23 - 23  Medical Diagnosis: M54.41, M54.42, G89.29 (ICD-10-CM) - Chronic midline low back pain with bilateral sciatica        Rehab Codes: M54.5, R53.1  Onset Date: 23                           Next 's appt.: ,  injections   Visit# / total visits:  (13/15 approved)     Cancels/No Shows:     Subjective:  \"Darron\"  Pain:  [x] Yes  [] No  Location: LB    Pain Rating: (0-10 scale) 2/10  Pain altered Tx:  [x] No  [] Yes  Action:  Comments: Pt arriving with 2/10

## 2023-11-07 ENCOUNTER — HOSPITAL ENCOUNTER (OUTPATIENT)
Dept: PHYSICAL THERAPY | Facility: CLINIC | Age: 28
Setting detail: THERAPIES SERIES
Discharge: HOME | End: 2023-11-07
Payer: OTHER GOVERNMENT

## 2023-11-07 PROCEDURE — 97110 THERAPEUTIC EXERCISES: CPT

## 2023-11-07 NOTE — FLOWSHEET NOTE
[] [address]  4600 [facility].  [phone]  F: [phone] [] 204 [facility]  [address]   [address]  P: [phone]  F: [phone] [x] [address]  151 [facility]  P: [phone]  F: [phone] [] [facility] [place]: [phone]  F: [phone] [] 224 [facility]  One [facility]   [address]   P: [phone]  F: [phone]  [] 7170 [facility].   P: [phone]  F: [phone] [] 205 [facility]  [address] [place]    [address]  P: [phone]  F: [phone] [] 224 [facility]  7[address] [facility]  [place]: [phone]  F: [phone] [] [address] [facility] [facility] [address]  [place]: [phone]  F: [phone]      Physical Therapy Daily Treatment Note    Date:  2023  Patient Name:  Ronnie Sharma    :  1995  MRN: [id number]  Physician: ANGELA Lim                              Insurance: [employer] (15/15 Visits Approved, AUTH AFTER ) 8/15/23 - 23  Medical Diagnosis: M54.41, M54.42, G89.29 (ICD-10-CM) - Chronic midline low back pain with bilateral sciatica        Rehab Codes: M54.5, R53.1  Onset Date: 23                           Next 's appt.: ,  injections   Visit# / total visits:  (14/15 approved)     Cancels/No Shows:     Subjective:  \"Darron\"  Pain:  [x] Yes  [] No  Location: LB    Pain Rating: (0-10 scale) 2/10  Pain altered Tx:  [x] No  [] Yes  Action:  Comments: Pt with no pain to

## 2023-11-09 ENCOUNTER — HOSPITAL ENCOUNTER (OUTPATIENT)
Dept: PAIN MANAGEMENT | Facility: CLINIC | Age: 28
Discharge: HOME | End: 2023-11-09
Payer: OTHER GOVERNMENT

## 2023-11-09 ENCOUNTER — HOSPITAL ENCOUNTER (OUTPATIENT)
Dept: PHYSICAL THERAPY | Facility: CLINIC | Age: 28
Setting detail: THERAPIES SERIES
Discharge: HOME | End: 2023-11-09
Payer: OTHER GOVERNMENT

## 2023-11-09 VITALS
TEMPERATURE: 97 F | SYSTOLIC BLOOD PRESSURE: 121 MMHG | HEART RATE: 69 BPM | RESPIRATION RATE: 10 BRPM | DIASTOLIC BLOOD PRESSURE: 75 MMHG | OXYGEN SATURATION: 99 %

## 2023-11-09 DIAGNOSIS — R52 PAIN MANAGEMENT: ICD-10-CM

## 2023-11-09 PROCEDURE — 2500000003 HC RX 250 WO HCPCS: Performed by: PAIN MEDICINE

## 2023-11-09 PROCEDURE — 64493 INJ PARAVERT F JNT L/S 1 LEV: CPT | Performed by: PAIN MEDICINE

## 2023-11-09 PROCEDURE — 6360000002 HC RX W HCPCS: Performed by: PAIN MEDICINE

## 2023-11-09 PROCEDURE — 64494 INJ PARAVERT F JNT L/S 2 LEV: CPT

## 2023-11-09 PROCEDURE — 64494 INJ PARAVERT F JNT L/S 2 LEV: CPT | Performed by: PAIN MEDICINE

## 2023-11-09 PROCEDURE — 64493 INJ PARAVERT F JNT L/S 1 LEV: CPT

## 2023-11-09 PROCEDURE — 97110 THERAPEUTIC EXERCISES: CPT

## 2023-11-09 RX ORDER — LIDOCAINE HYDROCHLORIDE 5 MG/ML
INJECTION, SOLUTION INFILTRATION; INTRAVENOUS
Status: COMPLETED | OUTPATIENT
Start: 2023-11-09 | End: 2023-11-09

## 2023-11-09 RX ORDER — BUPIVACAINE HYDROCHLORIDE 2.5 MG/ML
INJECTION, SOLUTION EPIDURAL; INFILTRATION; INTRACAUDAL
Status: COMPLETED | OUTPATIENT
Start: 2023-11-09 | End: 2023-11-09

## 2023-11-09 RX ADMIN — BUPIVACAINE HYDROCHLORIDE 10 ML: 2.5 INJECTION, SOLUTION EPIDURAL; INFILTRATION; INTRACAUDAL; PERINEURAL at 12:41

## 2023-11-09 RX ADMIN — LIDOCAINE HYDROCHLORIDE 10 ML: 5 INJECTION, SOLUTION INFILTRATION at 12:37

## 2023-11-09 ASSESSMENT — PAIN - FUNCTIONAL ASSESSMENT: PAIN_FUNCTIONAL_ASSESSMENT: 0-10

## 2023-11-09 ASSESSMENT — PAIN DESCRIPTION - DESCRIPTORS: DESCRIPTORS: ACHING;SHOOTING

## 2023-11-09 NOTE — OP NOTE
Lumbar Facet Nerve Block Injection:  Surgeon: Pinky Castillo MD     PRE-OP DIAGNOSIS: M47.817 (lumbosacral spondylosis), M54.5 (low back pain)    POST-OP DIAGNOSIS: Same. PROCEDURE PERFORMED: Lumbar Facet Nerve Block Multiple Levels  Bilateral L4 - 5 and L5 - S1. Physician confirmed and marked the surgical site. EBL: minimal      CONSENT: Patient has undergone the educational process with this procedure, is aware and fully understands the risks involved: potential damage to any and all body organs including possible bleeding, infection and nerve injury, allergic reaction and headache. Patient also understands that the procedure will be undertaken in a safe, controlled, and monitored setting. Patient recognizes that the benefits include relief from pain and reduction in the oral use of medications. Patient agreed to proceed. The patient was counseled at length about the risks of edilberto Covid-19 during their perioperative period and any recovery window from their procedure. The patient was made aware that edilberto Covid-19  may worsen their prognosis for recovering from their procedure  and lend to a higher morbidity and/or mortality risk. All material risks, benefits, and reasonable alternatives including postponing the procedure were discussed. The patient does wish to proceed with the procedure at this time. PREP: Timeout was performed prior to starting the procedure. The patient's back was prepped with chloroprep and draped appropriately. 0.5% lidocaine was used to anesthetize the skin and subcutaneous tissue. PROCEDURE NOTE: 25 gauge spinal needles were advanced under  fluoroscopic guidance to the appropriate anatomic location for the medial branches and/or dorsal ramus corresponding to the indicated facet joints. Aspiration was negative.  1 ml of 0.25% marcaine was then injected at each site to block medial branch nerve innervating the  Bilateral L4 - 5 and L5 - S1 facet

## 2023-11-09 NOTE — PROGRESS NOTES
[] [address]  4600 [facility] [place] [facility].  [phone]  F: [phone] [] 204 [facility]  [address]   [address]  P: [phone]  F: [phone] [x] [address]  151 [facility]  P: [phone]  F: [phone] [] [place]: [phone]  F: [phone] [] 224 [facility]  One [facility]   [address]   P: [phone]  F: [phone]  [] 7170 [facility].   P: [phone]  F: [phone] [] 205 [facility]  [address] [place]    [address]  P: [phone]  F: [phone] [] 224 [facility]  [address] [facility]  [place]: [phone]  F: [phone] [] [address] [facility] [address]  [place]: [phone]  F: [phone]      Physical Therapy Daily Treatment Note/ Progress Report    Date:  2023  Patient Name:  Wen Poon    :  1995  MRN: [id number]  Physician: ANGELA Levine                              Insurance: [employer] (15/15 Visits Approved, AUTH AFTER ) 8/15/23 - 23  Medical Diagnosis: M54.41, M54.42, G89.29 (ICD-10-CM) - Chronic midline low back pain with bilateral sciatica        Rehab Codes: M54.5, R53.1  Onset Date: 23                           Next 's appt.: ,  injections   Visit# / total visits: 15/23 (15/15 approved)     Cancels/No Shows:     Subjective:  \"Darron\"  Pain:  [] Yes  [x] No  Location: LB    Pain Rating: (0-10 scale) -/10  Pain altered Tx:  [x] No  [] Yes  Action:  Comments: Pt

## 2023-11-09 NOTE — DISCHARGE INSTRUCTIONS
You have received a sedative/anesthetic therefore you should not consume any alcoholic beverages for 24 hours. Do not drive or operate machinery for 24 hours. Do not take a tub bath for 72 hours after procedure (this includes hot tubs). You may shower, but avoid hot water to injection site. Avoid strenuous activity TODAY especially if you experience dizziness. Remove band-aid the next day. Wash off any residual iodine 24 hours from today. Do not use heat, heating pad, or any other heating device over the injection site for 3 days after the procedure. If you experience pain after your procedure, you may continue with your current pain medication as prescribed. (DO NOT INCREASE YOUR PAIN MEDICATION WITHOUT TALKING TO DOCTOR)  Soreness and pain at injection site is common, may use ice to reduce soreness. Please complete pain diary as instructed. The office staff will call you to discuss the results of the procedure within 24 hours, please call the office if you do not hear from them.       Call Natalie at [phone] if you experience:   Fever, chills or temperature over 100    Vomiting, headache, persistent stiff neck, nausea or blurred vision   Difficulty urinating or unable to urinate within 8 hours   Increase in weakness, numbness or loss of function of limbs  Increased redness, swelling or drainage at the injection site

## 2023-11-09 NOTE — H&P
Pain Pre-Op H&P Note    Brooks Guevara MD    HPI: Ronnie Sharma  presents with back pain. Past Medical History:   Diagnosis Date    Chronic back pain        History reviewed. No pertinent surgical history. Family History   Problem Relation Age of Onset    Heart Failure Mother     Diabetes Mother     Alcohol Abuse Mother     Diabetes Father        Allergies   Allergen Reactions    Lexapro [Escitalopram] Other (See Comments)     20 mg dose: worse insomnia and less energy       No current outpatient medications on file. Social History     Tobacco Use    Smoking status: Never    Smokeless tobacco: Never   Substance Use Topics    Alcohol use: Not Currently       Review of Systems:   Focused review of systems was performed, and negative as pertinent to diagnosis, except as stated in HPI. Physical Exam  Constitutional:       Appearance: Normal appearance. Pulmonary:      Effort: Pulmonary effort is normal.   Neurological:      Mental Status: alert. Psychiatric:         Attention and Perception: Attention and perception normal.         Mood and Affect: Mood and affect normal.   Cardiovascular:      Rate: Normal rate. ASA: 3          Mallampati: 2       Patient's current physical status, medications, medical history, and HPI have been reviewed and updated as appropriate on this date: 11/09/23    Risk/Benefit(s): The risks, benefits, alternatives, and potential complications have been discussed with the patient/family and informed consent has been obtained for the procedure/sedation.     Diagnosis:   spondylosis      Plan: JUANA Guevara MD

## 2023-11-10 ENCOUNTER — TELEPHONE (OUTPATIENT)
Dept: PAIN MANAGEMENT | Age: 28
End: 2023-11-10

## 2023-11-10 NOTE — TELEPHONE ENCOUNTER
S/P: Lumbar Facet Nerve Block Multiple Levels  Bilateral L4 - 5 and L5 - S1.      DOS: 11/09/2023    Pain  before procedure with activity : 5    Pain after procedure with activity: 2    Activities following procedure include: Did housework, walked around fallen timbers, went shopping    % of pain relief: 95%    Procedure successful: Yes    OR scheduled: 11/30/2023

## 2023-11-21 ENCOUNTER — HOSPITAL ENCOUNTER (OUTPATIENT)
Dept: PHYSICAL THERAPY | Facility: CLINIC | Age: 28
Setting detail: THERAPIES SERIES
Discharge: HOME | End: 2023-11-21
Payer: OTHER GOVERNMENT

## 2023-11-21 NOTE — CARE COORDINATION
[] [address]  4600 [facility].    [phone]  F: [phone]   [] [address]  [address]  P: [phone]  F: [phone]  [x] [address]  Therapy  [address] [facility] [address]  P: [phone]  F: [phone] [] [facility]  P: [phone]  F: [phone]  [] 224 [facility]  [address]   [place]: [phone]  F: [phone]   [] 97 [facility]  [address]  [place]: [phone]   F: [phone]     Physical Therapy Cancel/No Show note    Date: 2023  Patient: Shanae Tran  : 1995  MRN: [id number]    Cancels/No Shows to date:     For today's appointment patient:    [x]  Cancelled    [] Rescheduled appointment    [] No-show     Reason given by patient:    []  Patient ill    []  Conflicting appointment    [] No transportation      [] Conflict with work    [] No reason given    [] Weather related    [] [id number]    [] Other:      Comments:  Waiting for pt to see doc again      [] Next appointment was confirmed    Electronically signed by: Khoi Larson PTA

## 2023-11-30 ENCOUNTER — HOSPITAL ENCOUNTER (OUTPATIENT)
Dept: PAIN MANAGEMENT | Facility: CLINIC | Age: 28
Discharge: HOME | End: 2023-11-30
Payer: OTHER GOVERNMENT

## 2023-11-30 VITALS
SYSTOLIC BLOOD PRESSURE: 120 MMHG | HEIGHT: 69 IN | DIASTOLIC BLOOD PRESSURE: 78 MMHG | TEMPERATURE: 97.7 F | OXYGEN SATURATION: 96 % | RESPIRATION RATE: 8 BRPM | HEART RATE: 88 BPM | WEIGHT: 230 LBS | BODY MASS INDEX: 34.07 KG/M2

## 2023-11-30 DIAGNOSIS — R52 PAIN MANAGEMENT: ICD-10-CM

## 2023-11-30 PROCEDURE — 64494 INJ PARAVERT F JNT L/S 2 LEV: CPT | Performed by: PAIN MEDICINE

## 2023-11-30 PROCEDURE — 6360000002 HC RX W HCPCS: Performed by: PAIN MEDICINE

## 2023-11-30 PROCEDURE — 64494 INJ PARAVERT F JNT L/S 2 LEV: CPT

## 2023-11-30 PROCEDURE — 64493 INJ PARAVERT F JNT L/S 1 LEV: CPT | Performed by: PAIN MEDICINE

## 2023-11-30 PROCEDURE — 64493 INJ PARAVERT F JNT L/S 1 LEV: CPT

## 2023-11-30 PROCEDURE — 2500000003 HC RX 250 WO HCPCS: Performed by: PAIN MEDICINE

## 2023-11-30 RX ORDER — BUPIVACAINE HYDROCHLORIDE 2.5 MG/ML
INJECTION, SOLUTION EPIDURAL; INFILTRATION; INTRACAUDAL
Status: COMPLETED | OUTPATIENT
Start: 2023-11-30 | End: 2023-11-30

## 2023-11-30 RX ORDER — MIDAZOLAM HYDROCHLORIDE 2 MG/2ML
INJECTION, SOLUTION INTRAMUSCULAR; INTRAVENOUS
Status: COMPLETED | OUTPATIENT
Start: 2023-11-30 | End: 2023-11-30

## 2023-11-30 RX ORDER — LIDOCAINE HYDROCHLORIDE 5 MG/ML
INJECTION, SOLUTION INFILTRATION; INTRAVENOUS
Status: COMPLETED | OUTPATIENT
Start: 2023-11-30 | End: 2023-11-30

## 2023-11-30 RX ADMIN — MIDAZOLAM HYDROCHLORIDE 1 MG: 1 INJECTION, SOLUTION INTRAMUSCULAR; INTRAVENOUS at 16:27

## 2023-11-30 RX ADMIN — BUPIVACAINE HYDROCHLORIDE 10 ML: 2.5 INJECTION, SOLUTION EPIDURAL; INFILTRATION; INTRACAUDAL; PERINEURAL at 16:34

## 2023-11-30 RX ADMIN — LIDOCAINE HYDROCHLORIDE 8 ML: 5 INJECTION, SOLUTION INFILTRATION at 16:31

## 2023-11-30 ASSESSMENT — PAIN DESCRIPTION - DESCRIPTORS: DESCRIPTORS: ACHING;SHOOTING

## 2023-11-30 ASSESSMENT — PAIN - FUNCTIONAL ASSESSMENT
PAIN_FUNCTIONAL_ASSESSMENT: 0-10
PAIN_FUNCTIONAL_ASSESSMENT: PREVENTS OR INTERFERES WITH MANY ACTIVE NOT PASSIVE ACTIVITIES
PAIN_FUNCTIONAL_ASSESSMENT: NONE - DENIES PAIN

## 2023-11-30 NOTE — DISCHARGE INSTRUCTIONS
You have received a sedative/anesthetic therefore you should not consume any alcoholic beverages for 24 hours. Do not drive or operate machinery for 24 hours. Do not take a tub bath for 72 hours after procedure (this includes hot tubs). You may shower, but avoid hot water to injection site. Avoid strenuous activity TODAY especially if you experience dizziness. Remove band-aid the next day. Wash off any residual iodine 24 hours from today. Do not use heat, heating pad, or any other heating device over the injection site for 3 days after the procedure. If you experience pain after your procedure, you may continue with your current pain medication as prescribed. (DO NOT INCREASE YOUR PAIN MEDICATION WITHOUT TALKING TO DOCTOR)  Soreness and pain at injection site is common, may use ice to reduce soreness. Please complete pain diary as instructed. The office staff will call you to discuss the results of the procedure within 24 hours, please call the office if you do not hear from them.       Call Natalie at [phone] if you experience:   Fever, chills or temperature over 100    Vomiting, headache, persistent stiff neck, nausea or blurred vision   Difficulty urinating or unable to urinate within 8 hours   Increase in weakness, numbness or loss of function of limbs  Increased redness, swelling or drainage at the injection site

## 2023-11-30 NOTE — OP NOTE
Lumbar Facet Nerve Block Injection:  Surgeon: Elodia Lundborg, MD     PRE-OP DIAGNOSIS: M47.817 (lumbosacral spondylosis), M54.5 (low back pain)    POST-OP DIAGNOSIS: Same. PROCEDURE PERFORMED: Lumbar Facet Nerve Block Multiple Levels  Bilateral L4 - 5 and L5 - S1. Physician confirmed and marked the surgical site. EBL: minimal      CONSENT: Patient has undergone the educational process with this procedure, is aware and fully understands the risks involved: potential damage to any and all body organs including possible bleeding, infection and nerve injury, allergic reaction and headache. Patient also understands that the procedure will be undertaken in a safe, controlled, and monitored setting. Patient recognizes that the benefits include relief from pain and reduction in the oral use of medications. Patient agreed to proceed. The patient was counseled at length about the risks of edilberto Covid-19 during their perioperative period and any recovery window from their procedure. The patient was made aware that edilberto Covid-19  may worsen their prognosis for recovering from their procedure  and lend to a higher morbidity and/or mortality risk. All material risks, benefits, and reasonable alternatives including postponing the procedure were discussed. The patient does wish to proceed with the procedure at this time. PREP: Timeout was performed prior to starting the procedure. The patient's back was prepped with chloroprep and draped appropriately. 0.5% lidocaine was used to anesthetize the skin and subcutaneous tissue. PROCEDURE NOTE: 25 gauge spinal needles were advanced under  fluoroscopic guidance to the appropriate anatomic location for the medial branches and/or dorsal ramus corresponding to the indicated facet joints. Aspiration was negative.  1 ml of 0.25% marcaine was then injected at each site to block medial branch nerve innervating the  Bilateral L4 - 5 and L5 - S1 facet

## 2023-11-30 NOTE — H&P
Pain Pre-Op H&P Note    Manisha Harrington MD    HPI: Serena Ames  presents with back pain. Did well with MBB x 1. Wishes to proceed confirmatory block. Past Medical History:   Diagnosis Date    Chronic back pain        History reviewed. No pertinent surgical history. Family History   Problem Relation Age of Onset    Heart Failure Mother     Diabetes Mother     Alcohol Abuse Mother     Diabetes Father        Allergies   Allergen Reactions    Lexapro [Escitalopram] Other (See Comments)     20 mg dose: worse insomnia and less energy       No current outpatient medications on file. Social History     Tobacco Use    Smoking status: Never    Smokeless tobacco: Never   Substance Use Topics    Alcohol use: Not Currently       Review of Systems:   Focused review of systems was performed, and negative as pertinent to diagnosis, except as stated in HPI. Physical Exam  Constitutional:       Appearance: Normal appearance. Pulmonary:      Effort: Pulmonary effort is normal.   Neurological:      Mental Status: alert. Psychiatric:         Attention and Perception: Attention and perception normal.         Mood and Affect: Mood and affect normal.   Cardiovascular:      Rate: Normal rate. ASA: 3          Mallampati: 2       Patient's current physical status, medications, medical history, and HPI have been reviewed and updated as appropriate on this date: 11/30/23    Risk/Benefit(s): The risks, benefits, alternatives, and potential complications have been discussed with the patient/family and informed consent has been obtained for the procedure/sedation.     Diagnosis:   spondylosis      Plan: JUANA Harrington MD

## 2023-12-01 ENCOUNTER — TELEPHONE (OUTPATIENT)
Dept: PAIN MANAGEMENT | Age: 28
End: 2023-12-01

## 2023-12-01 DIAGNOSIS — M54.50 CHRONIC LOW BACK PAIN, UNSPECIFIED BACK PAIN LATERALITY, UNSPECIFIED WHETHER SCIATICA PRESENT: ICD-10-CM

## 2023-12-01 DIAGNOSIS — M47.817 LUMBOSACRAL SPONDYLOSIS WITHOUT MYELOPATHY: Primary | ICD-10-CM

## 2023-12-01 DIAGNOSIS — G89.29 CHRONIC LOW BACK PAIN, UNSPECIFIED BACK PAIN LATERALITY, UNSPECIFIED WHETHER SCIATICA PRESENT: ICD-10-CM

## 2023-12-01 NOTE — TELEPHONE ENCOUNTER
S/P: Lumbar Facet Nerve Block Multiple Levels  Bilateral L4 - 5 and L5 - S1.      DOS: 11/30/2023    Pain  before procedure with activity : 5    Pain after procedure with activity: 0    Activities following procedure include: Sat for an hour (causes the most pain) and walked around    % of pain relief: 95%    Procedure successful: Yes    OR scheduled: 12/21/23

## 2023-12-15 ENCOUNTER — PATIENT MESSAGE (OUTPATIENT)
Dept: PRIMARY CARE CLINIC | Age: 28
End: 2023-12-15

## 2023-12-15 DIAGNOSIS — G47.33 OSA (OBSTRUCTIVE SLEEP APNEA): Primary | ICD-10-CM

## 2023-12-28 NOTE — TELEPHONE ENCOUNTER
From: Addison Guajardo  To: Dr. Danielle Yoon  Sent: 12/15/2023 8:54 AM EST  Subject: Sleep Study Referral     Dr Moon put in a sleep study referral for me. I have that scheduled for 22nd. However the [facility] authorization was approved for a different sleep center. Could we resubmit for the authorization for the [facility]?

## 2024-01-10 ENCOUNTER — CLINICAL DOCUMENTATION (OUTPATIENT)
Dept: PHYSICAL THERAPY | Facility: CLINIC | Age: 29
End: 2024-01-10

## 2024-01-10 NOTE — DISCHARGE SUMMARY
Program                     Dexamethasone Sodium  [] Manual Therapy             Phosphate 40-80 mAmin  [] Aquatic Therapy                   [] Vasocompression/    [] Other:             Game Ready    Discharge Status:     [] Pt recovered from conditions. Treatment goals were met.    [] Pt received maximum benefit. No further therapy indicated at this time.    [] Pt to continue exercise/home instructions independently.    [] Therapy interrupted due to:    [] Pt has 2 or more no shows/cancels, is discontinued per our policy.    [] Pt has completed prescribed number of treatment sessions.    [x] Other: Pt cx's last appt and was waiting for f/u with his physician. No further contact to reschedule and return to therapy. Plan to D/C at this time.         Electronically signed by Comfort Diez PTA on 1/10/2024 at 1:38 PM      If you have any questions or concerns, please don't hesitate to call.  Thank you for your referral.

## 2024-01-11 ENCOUNTER — HOSPITAL ENCOUNTER (OUTPATIENT)
Dept: PAIN MANAGEMENT | Facility: CLINIC | Age: 29
Discharge: HOME | End: 2024-01-11
Payer: OTHER GOVERNMENT

## 2024-01-11 VITALS
TEMPERATURE: 97.7 F | HEART RATE: 90 BPM | HEIGHT: 69 IN | RESPIRATION RATE: 12 BRPM | BODY MASS INDEX: 34.07 KG/M2 | DIASTOLIC BLOOD PRESSURE: 65 MMHG | SYSTOLIC BLOOD PRESSURE: 109 MMHG | WEIGHT: 230 LBS | OXYGEN SATURATION: 99 %

## 2024-01-11 DIAGNOSIS — R52 PAIN MANAGEMENT: ICD-10-CM

## 2024-01-11 PROCEDURE — 6360000002 HC RX W HCPCS: Performed by: PAIN MEDICINE

## 2024-01-11 PROCEDURE — 2500000003 HC RX 250 WO HCPCS: Performed by: PAIN MEDICINE

## 2024-01-11 PROCEDURE — 64635 DESTROY LUMB/SAC FACET JNT: CPT

## 2024-01-11 PROCEDURE — 64635 DESTROY LUMB/SAC FACET JNT: CPT | Performed by: PAIN MEDICINE

## 2024-01-11 PROCEDURE — 64636 DESTROY L/S FACET JNT ADDL: CPT | Performed by: PAIN MEDICINE

## 2024-01-11 PROCEDURE — 64636 DESTROY L/S FACET JNT ADDL: CPT

## 2024-01-11 RX ORDER — MIDAZOLAM HYDROCHLORIDE 2 MG/2ML
INJECTION, SOLUTION INTRAMUSCULAR; INTRAVENOUS
Status: COMPLETED | OUTPATIENT
Start: 2024-01-11 | End: 2024-01-11

## 2024-01-11 RX ORDER — BUPIVACAINE HYDROCHLORIDE 2.5 MG/ML
INJECTION, SOLUTION EPIDURAL; INFILTRATION; INTRACAUDAL
Status: COMPLETED | OUTPATIENT
Start: 2024-01-11 | End: 2024-01-11

## 2024-01-11 RX ORDER — LIDOCAINE HYDROCHLORIDE 5 MG/ML
INJECTION, SOLUTION INFILTRATION; INTRAVENOUS
Status: COMPLETED | OUTPATIENT
Start: 2024-01-11 | End: 2024-01-11

## 2024-01-11 RX ADMIN — LIDOCAINE HYDROCHLORIDE 6 ML: 5 INJECTION, SOLUTION INFILTRATION at 12:52

## 2024-01-11 RX ADMIN — MIDAZOLAM HYDROCHLORIDE 2 MG: 1 INJECTION, SOLUTION INTRAMUSCULAR; INTRAVENOUS at 12:51

## 2024-01-11 RX ADMIN — BUPIVACAINE HYDROCHLORIDE 3 ML: 2.5 INJECTION, SOLUTION EPIDURAL; INFILTRATION; INTRACAUDAL; PERINEURAL at 12:58

## 2024-01-11 ASSESSMENT — PAIN DESCRIPTION - LOCATION: LOCATION: BACK

## 2024-01-11 ASSESSMENT — PAIN DESCRIPTION - DESCRIPTORS: DESCRIPTORS: DULL

## 2024-01-11 ASSESSMENT — PAIN - FUNCTIONAL ASSESSMENT
PAIN_FUNCTIONAL_ASSESSMENT: ACTIVITIES ARE NOT PREVENTED
PAIN_FUNCTIONAL_ASSESSMENT: 0-10

## 2024-01-11 ASSESSMENT — PAIN DESCRIPTION - ORIENTATION: ORIENTATION: LOWER

## 2024-01-11 ASSESSMENT — PAIN SCALES - GENERAL: PAINLEVEL_OUTOF10: 3

## 2024-01-11 NOTE — OP NOTE
Lumbar Radiofrequency Ablation:  SURGEON: Natalia Manning MD    PRE-OP DIAGNOSIS: M47.817 (lumbosacral spondylosis), M54.5 (low back pain)    POST-OP DIAGNOSIS: Same.    PROCEDURE PERFORMED: Radiofrequency Ablation Medial Branch Nerve at Right L4 - 5 and L5 - S1 facet joint(s).    HISTORY AND INDICATIONS: Satisfactory response with previous RFA/ medial branch blocks at the indicated levels.    Recurrence of painful symptoms attributed to the above diagnosis.    The planned treatment is medically necessary to relieve pain, restore function and or reduce reliance on pain medication.    EBL: minimal    CONSENT: Patient has undergone the educational process with this procedure, is aware and fully understands the risks involved: potential damage to any and all body organs including possible bleeding, infection, nerve injury, paralysis, allergic reaction and headache. Patient also understands that the procedure will be undertaken in a safe, controlled and monitored setting. Patient recognizes that the benefits may include relief from pain and reduction in the oral use of medications. Patient agreed to proceed.     The patient was counseled at length about the risks of edilberto Covid-19 during their perioperative period and any recovery window from their procedure.  The patient was made aware that edilberto Covid-19  may worsen their prognosis for recovering from their procedure  and lend to a higher morbidity and/or mortality risk.  All material risks, benefits, and reasonable alternatives including postponing the procedure were discussed. The patient does wish to proceed with the procedure at this time.      PROCEDURE NOTE: The patient was taken to the procedure room and placed prone with the appropriate padding and positioning to assure patient comfort and physician access to the procedure site. Time out was performed prior to starting the procedure. Fluoroscopic evaluation was utilized to target the appropriate

## 2024-01-11 NOTE — DISCHARGE INSTRUCTIONS
You have received a sedative/anesthetic therefore you should not consume any alcoholic beverages for 24 hours.  Do not drive or operate machinery for 24 hours.   Do not take a tub bath for 72 hours after procedure (this includes hot tubs).  You may shower, but avoid hot water to injection site.   Avoid strenuous activity TODAY especially if you experience dizziness.   Remove band-aid the next day.    Wash off any residual iodine 24 hours from today.   Do not use heat, heating pad, or any other heating device over the injection site for 3 days after the procedure.    If you experience pain after your procedure, you may continue with your current pain medication as prescribed.  (DO NOT INCREASE YOUR PAIN MEDICATION WITHOUT TALKING TO DOCTOR)  Soreness and pain at injection site is common, may use ice to reduce soreness.    Call [facility] at [phone] if you experience:   Fever, chills or temperature over 100    Vomiting, headache, persistent stiff neck, nausea or blurred vision   Difficulty urinating or unable to urinate within 8 hours   Increase in weakness, numbness or loss of function of limbs  Increased redness, swelling or drainage at the injection site

## 2024-01-11 NOTE — H&P
Pain Pre-Op H&P Note    Natalia Manning MD    HPI: Addison Guajardo  presents with back pain.    Past Medical History:   Diagnosis Date    Chronic back pain        History reviewed. No pertinent surgical history.    Family History   Problem Relation Age of Onset    Heart Failure Mother     Diabetes Mother     Alcohol Abuse Mother     Diabetes Father        Allergies   Allergen Reactions    Lexapro [Escitalopram] Other (See Comments)     20 mg dose: worse insomnia and less energy       No current outpatient medications on file.    Social History     Tobacco Use    Smoking status: Never    Smokeless tobacco: Never   Substance Use Topics    Alcohol use: Not Currently       Review of Systems:   Focused review of systems was performed, and negative as pertinent to diagnosis, except as stated in HPI.      Physical Exam  Constitutional:       Appearance: Normal appearance.   Pulmonary:      Effort: Pulmonary effort is normal.   Neurological:      Mental Status: alert.   Psychiatric:         Attention and Perception: Attention and perception normal.         Mood and Affect: Mood and affect normal.   Cardiovascular:      Rate: Normal rate.         ASA: 3          Mallampati: 2       Patient's current physical status, medications, medical history, and HPI have been reviewed and updated as appropriate on this date: 01/11/24    Risk/Benefit(s): The risks, benefits, alternatives, and potential complications have been discussed with the patient/family and informed consent has been obtained for the procedure/sedation.    Diagnosis:   spondylosis      Plan: RFA        Natalia Manning MD

## 2024-01-13 ENCOUNTER — HOSPITAL ENCOUNTER (OUTPATIENT)
Dept: SLEEP CENTER | Age: 29
Discharge: HOME | End: 2024-01-15
Payer: OTHER GOVERNMENT

## 2024-01-13 DIAGNOSIS — R06.83 LOUD SNORING: ICD-10-CM

## 2024-01-13 PROCEDURE — 95810 POLYSOM 6/> YRS 4/> PARAM: CPT

## 2024-01-14 VITALS
HEIGHT: 69 IN | BODY MASS INDEX: 34.51 KG/M2 | WEIGHT: 233 LBS | OXYGEN SATURATION: 92 % | HEART RATE: 69 BPM | RESPIRATION RATE: 12 BRPM

## 2024-01-14 ASSESSMENT — SLEEP AND FATIGUE QUESTIONNAIRES
HOW LIKELY ARE YOU TO NOD OFF OR FALL ASLEEP WHEN YOU ARE A PASSENGER IN A CAR FOR AN HOUR WITHOUT A BREAK: 3
HOW LIKELY ARE YOU TO NOD OFF OR FALL ASLEEP IN A CAR, WHILE STOPPED FOR A FEW MINUTES IN TRAFFIC: 2
HOW LIKELY ARE YOU TO NOD OFF OR FALL ASLEEP WHILE SITTING INACTIVE IN A PUBLIC PLACE: 1
HOW LIKELY ARE YOU TO NOD OFF OR FALL ASLEEP WHILE SITTING AND READING: 2
ESS TOTAL SCORE: 12
HOW LIKELY ARE YOU TO NOD OFF OR FALL ASLEEP WHILE SITTING AND TALKING TO SOMEONE: 0
HOW LIKELY ARE YOU TO NOD OFF OR FALL ASLEEP WHILE LYING DOWN TO REST IN THE AFTERNOON WHEN CIRCUMSTANCES PERMIT: 2
HOW LIKELY ARE YOU TO NOD OFF OR FALL ASLEEP WHILE WATCHING TV: 1
HOW LIKELY ARE YOU TO NOD OFF OR FALL ASLEEP WHILE SITTING QUIETLY AFTER LUNCH WITHOUT ALCOHOL: 1

## 2024-01-15 PROBLEM — R06.83 LOUD SNORING: Status: ACTIVE | Noted: 2024-01-15

## 2024-01-15 LAB — STATUS: NORMAL

## 2024-01-15 PROCEDURE — 95810 POLYSOM 6/> YRS 4/> PARAM: CPT | Performed by: PSYCHIATRY & NEUROLOGY

## 2024-01-18 ENCOUNTER — OFFICE VISIT (OUTPATIENT)
Dept: ORTHOPEDIC SURGERY | Age: 29
End: 2024-01-18
Payer: OTHER GOVERNMENT

## 2024-01-18 VITALS — BODY MASS INDEX: 35.1 KG/M2 | WEIGHT: 237 LBS | HEIGHT: 69 IN | RESPIRATION RATE: 15 BRPM

## 2024-01-18 DIAGNOSIS — G47.33 SEVERE OBSTRUCTIVE SLEEP APNEA: Primary | ICD-10-CM

## 2024-01-18 DIAGNOSIS — M54.41 CHRONIC MIDLINE LOW BACK PAIN WITH BILATERAL SCIATICA: Primary | ICD-10-CM

## 2024-01-18 DIAGNOSIS — M54.42 CHRONIC MIDLINE LOW BACK PAIN WITH BILATERAL SCIATICA: Primary | ICD-10-CM

## 2024-01-18 DIAGNOSIS — M48.061 FORAMINAL STENOSIS OF LUMBAR REGION: ICD-10-CM

## 2024-01-18 DIAGNOSIS — G89.29 CHRONIC MIDLINE LOW BACK PAIN WITH BILATERAL SCIATICA: Primary | ICD-10-CM

## 2024-01-18 PROCEDURE — 99213 OFFICE O/P EST LOW 20 MIN: CPT | Performed by: PHYSICIAN ASSISTANT

## 2024-01-22 ENCOUNTER — OFFICE VISIT (OUTPATIENT)
Dept: PAIN MANAGEMENT | Age: 29
End: 2024-01-22
Payer: OTHER GOVERNMENT

## 2024-01-22 VITALS — HEIGHT: 69 IN | BODY MASS INDEX: 35.1 KG/M2 | WEIGHT: 237 LBS

## 2024-01-22 DIAGNOSIS — M51.26 LUMBAR DISC HERNIATION: Primary | Chronic | ICD-10-CM

## 2024-01-22 DIAGNOSIS — M54.50 LUMBAR BACK PAIN: ICD-10-CM

## 2024-01-22 PROCEDURE — 99212 OFFICE O/P EST SF 10 MIN: CPT | Performed by: NURSE PRACTITIONER

## 2024-01-22 ASSESSMENT — ENCOUNTER SYMPTOMS
SHORTNESS OF BREATH: 0
COUGH: 0
BOWEL INCONTINENCE: 0
CONSTIPATION: 0
BACK PAIN: 1

## 2024-01-22 NOTE — PROGRESS NOTES
There is  age-appropriate bone marrow signal.  There is degenerative disc disease with  loss of disc signal at L4-5 and L5-S1.  There is no spondylolisthesis.     SPINAL CORD: The conus terminates normally.     SOFT TISSUES: No paraspinal mass identified.     L1-L2: There is no significant disc herniation, spinal canal stenosis or  neural foraminal narrowing.     L2-L3: There is a circumferential disc bulge.  There is no canal stenosis or  foraminal narrowing.     L3-L4: There is a circumferential disc bulge.  There is no canal stenosis or  foraminal narrowing.     L4-L5: There is a circumferential disc bulge.  There is no canal stenosis or  left foraminal narrowing.  There is mild right foraminal narrowing.     L5-S1: There is a circumferential disc bulge.  There is no canal stenosis or  significant foraminal narrowing.     IMPRESSION:  Multilevel degenerative change most pronounced in the lower lumbar spine with  mild right foraminal narrowing at L4-5.    Assessment:  Problem List Items Addressed This Visit       Lumbar disc herniation - Primary (Chronic)    Lumbar back pain          Treatment Plan:  Patient reports significant relief following lumbar RFA - 80-90%  He denies further concerns at this time  Follow up appointment made for 3 months    I have reviewed the chief complaint and history of present illness (including ROS and PFSH) and vital documentation by my staff and I agree with their documentation and have added where applicable.

## 2024-02-02 ENCOUNTER — TELEPHONE (OUTPATIENT)
Dept: PRIMARY CARE CLINIC | Age: 29
End: 2024-02-02

## 2024-02-02 NOTE — TELEPHONE ENCOUNTER
Patient states his insurance ([employer]) will no longer accept a faxed prior auth. Patient states the PA needs to be completed on the   [employer] portal or by calling [employer].    Patient states the PA for his sleep study needs to be submitted again.

## 2024-02-26 ENCOUNTER — HOSPITAL ENCOUNTER (OUTPATIENT)
Dept: SLEEP CENTER | Age: 29
Discharge: HOME | End: 2024-02-28
Payer: OTHER GOVERNMENT

## 2024-02-26 DIAGNOSIS — G47.33 SEVERE OBSTRUCTIVE SLEEP APNEA: ICD-10-CM

## 2024-02-26 PROCEDURE — 95811 POLYSOM 6/>YRS CPAP 4/> PARM: CPT

## 2024-02-27 VITALS
RESPIRATION RATE: 12 BRPM | WEIGHT: 233 LBS | HEART RATE: 67 BPM | OXYGEN SATURATION: 93 % | BODY MASS INDEX: 34.51 KG/M2 | HEIGHT: 69 IN

## 2024-02-27 NOTE — PAYOR INFORMATION
Verified Demographics with Patient? No   If no why? Already verified  MSP Completed?  No    If not why? Already completed  Verified Insurance through: Already verified  COB Completed? Not required  Auth Verification #:[id number]  Liability Due: $150  Discount Offered: yes       Previous Balance: $ 0   Discount Offered: 20%  Site Collect Status: pt refused  Patient Response: prefers to be billed  Financial Aid Offered? Yes Pt declined  Cards Scanned: yes

## 2024-03-07 ENCOUNTER — OFFICE VISIT (OUTPATIENT)
Dept: ORTHOPEDIC SURGERY | Age: 29
End: 2024-03-07
Payer: OTHER GOVERNMENT

## 2024-03-07 VITALS — RESPIRATION RATE: 16 BRPM | HEIGHT: 69 IN | BODY MASS INDEX: 34.36 KG/M2 | WEIGHT: 232 LBS

## 2024-03-07 DIAGNOSIS — M54.42 CHRONIC MIDLINE LOW BACK PAIN WITH BILATERAL SCIATICA: Primary | ICD-10-CM

## 2024-03-07 DIAGNOSIS — M54.41 CHRONIC MIDLINE LOW BACK PAIN WITH BILATERAL SCIATICA: Primary | ICD-10-CM

## 2024-03-07 DIAGNOSIS — G89.29 CHRONIC MIDLINE LOW BACK PAIN WITH BILATERAL SCIATICA: Primary | ICD-10-CM

## 2024-03-07 LAB — STATUS: NORMAL

## 2024-03-07 PROCEDURE — 99213 OFFICE O/P EST LOW 20 MIN: CPT | Performed by: PHYSICIAN ASSISTANT

## 2024-03-07 NOTE — PROGRESS NOTES
Patient ID: Addison Guajardo is a 29 y.o. male    Chief Compliant:  Chief Complaint   Patient presents with    Lower Back Pain      HPI:  This is a 29 y.o. male who presents to the clinic today for Follow-up of chronic midline low back pain with bilateral radicular pain.  Patient has had a complete workup with MRI in 2023, medial branch blocks, and RFA's completed.  Patient states that this time that he is doing exceptionally well his last pain management procedure with an RFA was done on 1/11/2024 patient reports that he has been back to running and he has not had his symptoms present.  Patient is happy with his progress.  He reports that he is being sent to [place] for the  where he will live there for another 4 years.  No new changes at this time..       Review of Systems   All other systems reviewed and are negative.    Past History:  No current outpatient medications on file.  Allergies   Allergen Reactions    Lexapro [Escitalopram] Other (See Comments)     20 mg dose: worse insomnia and less energy     Social History     Socioeconomic History    Marital status:      Spouse name: Not on file    Number of children: Not on file    Years of education: Not on file    Highest education level: Not on file   Occupational History    Not on file   Tobacco Use    Smoking status: Never    Smokeless tobacco: Never   Vaping Use    Vaping Use: Never used   Substance and Sexual Activity    Alcohol use: Not Currently    Drug use: Never    Sexual activity: Yes     Partners: Female   Other Topics Concern    Not on file   Social History Narrative    Not on file     Social Determinants of Health     Financial Resource Strain: Low Risk  (2/3/2023)    Overall Financial Resource Strain (CARDIA)     Difficulty of Paying Living Expenses: Not hard at all   Food Insecurity: Not on file (2/3/2023)   Transportation Needs: Unknown (2/3/2023)    PRAPARE - Transportation     Lack of Transportation (Medical): Not on file     Lack of

## 2024-03-08 DIAGNOSIS — G47.33 OSA (OBSTRUCTIVE SLEEP APNEA): Primary | ICD-10-CM

## 2024-03-28 ENCOUNTER — PATIENT MESSAGE (OUTPATIENT)
Dept: PRIMARY CARE CLINIC | Age: 29
End: 2024-03-28

## 2024-03-28 DIAGNOSIS — F32.A DEPRESSION, UNSPECIFIED DEPRESSION TYPE: Primary | ICD-10-CM

## 2024-03-29 NOTE — TELEPHONE ENCOUNTER
From: Addison Guajardo  To: Dr. Danielle Yoon  Sent: 3/28/2024 8:54 PM EDT  Subject: Psychiatry Referral     I want to try counseling again. I just wasn’t ready for it yet mentally. I wanted to see if I could get a referral sent to  for Dr. Meliton Jose at [facility]?

## 2024-06-18 ENCOUNTER — OFFICE VISIT (OUTPATIENT)
Dept: PRIMARY CARE CLINIC | Age: 29
End: 2024-06-18
Payer: OTHER GOVERNMENT

## 2024-06-18 VITALS
DIASTOLIC BLOOD PRESSURE: 70 MMHG | SYSTOLIC BLOOD PRESSURE: 112 MMHG | HEART RATE: 89 BPM | BODY MASS INDEX: 35.25 KG/M2 | HEIGHT: 69 IN | WEIGHT: 238 LBS | OXYGEN SATURATION: 99 %

## 2024-06-18 DIAGNOSIS — L60.0 INGROWN NAIL OF GREAT TOE OF RIGHT FOOT: Primary | ICD-10-CM

## 2024-06-18 PROCEDURE — 99213 OFFICE O/P EST LOW 20 MIN: CPT | Performed by: STUDENT IN AN ORGANIZED HEALTH CARE EDUCATION/TRAINING PROGRAM

## 2024-06-18 ASSESSMENT — PATIENT HEALTH QUESTIONNAIRE - PHQ9
SUM OF ALL RESPONSES TO PHQ QUESTIONS 1-9: 0
2. FEELING DOWN, DEPRESSED OR HOPELESS: NOT AT ALL
10. IF YOU CHECKED OFF ANY PROBLEMS, HOW DIFFICULT HAVE THESE PROBLEMS MADE IT FOR YOU TO DO YOUR WORK, TAKE CARE OF THINGS AT HOME, OR GET ALONG WITH OTHER PEOPLE: NOT DIFFICULT AT ALL
3. TROUBLE FALLING OR STAYING ASLEEP: NOT AT ALL
SUM OF ALL RESPONSES TO PHQ9 QUESTIONS 1 & 2: 0
7. TROUBLE CONCENTRATING ON THINGS, SUCH AS READING THE NEWSPAPER OR WATCHING TELEVISION: NOT AT ALL
1. LITTLE INTEREST OR PLEASURE IN DOING THINGS: NOT AT ALL
9. THOUGHTS THAT YOU WOULD BE BETTER OFF DEAD, OR OF HURTING YOURSELF: NOT AT ALL
5. POOR APPETITE OR OVEREATING: NOT AT ALL
4. FEELING TIRED OR HAVING LITTLE ENERGY: NOT AT ALL
6. FEELING BAD ABOUT YOURSELF - OR THAT YOU ARE A FAILURE OR HAVE LET YOURSELF OR YOUR FAMILY DOWN: NOT AT ALL
SUM OF ALL RESPONSES TO PHQ QUESTIONS 1-9: 0
8. MOVING OR SPEAKING SO SLOWLY THAT OTHER PEOPLE COULD HAVE NOTICED. OR THE OPPOSITE, BEING SO FIGETY OR RESTLESS THAT YOU HAVE BEEN MOVING AROUND A LOT MORE THAN USUAL: NOT AT ALL
SUM OF ALL RESPONSES TO PHQ QUESTIONS 1-9: 0
SUM OF ALL RESPONSES TO PHQ QUESTIONS 1-9: 0

## 2024-06-18 ASSESSMENT — ENCOUNTER SYMPTOMS: SHORTNESS OF BREATH: 0

## 2024-06-18 NOTE — PROGRESS NOTES
[facility]  [facility]  [address]  [place] [address]  Dept: [phone]    Addison Guajardo is a 29 y.o. male established patient, who presents acutely for his complaints as noted below:    Chief Complaint   Patient presents with    Ingrown Toenail     Ingrown toenail right big toe       HPI:     Right great toe pain: suspects infection/ingrown nail. States that symptoms started 2-3 weeks ago. Has had on his left foot before but never on his right. Has been soaking it, removed piece from nail.     Reviewed prior notes from PCP.  Reviewed previous labs.    No components found for: \"LDLCHOLESTEROL\", \"LDLCALC\"    (goal LDL is <100)   AST (U/L)   Date Value   09/30/2021 19     ALT (U/L)   Date Value   09/30/2021 21     BUN (mg/dL)   Date Value   09/30/2021 18     BP Readings from Last 3 Encounters:   06/18/24 112/70   01/11/24 109/65   12/21/23 114/79          (goal 120/80)    Past Medical History:   Diagnosis Date    Chronic back pain       No past surgical history on file.    Family History   Problem Relation Age of Onset    Heart Failure Mother     Diabetes Mother     Alcohol Abuse Mother     Diabetes Father        Social History     Tobacco Use    Smoking status: Never    Smokeless tobacco: Never   Substance Use Topics    Alcohol use: Not Currently      No current outpatient medications on file.     No current facility-administered medications for this visit.     Allergies   Allergen Reactions    Lexapro [Escitalopram] Other (See Comments)     20 mg dose: worse insomnia and less energy       Health Maintenance   Topic Date Due    Hepatitis B vaccine (1 of 3 - 3-dose series) Never done    HIV screen  Never done    Hepatitis C screen  Never done    COVID-19 Vaccine (3 - 2023-24 season) 09/01/2023    Depression Monitoring  05/10/2024    DTaP/Tdap/Td vaccine (2 - Td or Tdap) 09/24/2024    Varicella vaccine  Completed    Flu vaccine  Completed    Hepatitis A vaccine  Aged

## 2024-09-20 ENCOUNTER — OFFICE VISIT (OUTPATIENT)
Dept: PAIN MANAGEMENT | Age: 29
End: 2024-09-20
Payer: OTHER GOVERNMENT

## 2024-09-20 VITALS — BODY MASS INDEX: 35.25 KG/M2 | WEIGHT: 238 LBS | HEIGHT: 69 IN

## 2024-09-20 DIAGNOSIS — M47.817 LUMBOSACRAL SPONDYLOSIS WITHOUT MYELOPATHY: Primary | ICD-10-CM

## 2024-09-20 PROCEDURE — 99214 OFFICE O/P EST MOD 30 MIN: CPT | Performed by: PAIN MEDICINE

## 2024-09-20 ASSESSMENT — ENCOUNTER SYMPTOMS
BACK PAIN: 1
BOWEL INCONTINENCE: 0

## 2024-09-25 ENCOUNTER — PATIENT MESSAGE (OUTPATIENT)
Dept: PRIMARY CARE CLINIC | Age: 29
End: 2024-09-25

## 2024-10-10 ENCOUNTER — HOSPITAL ENCOUNTER (OUTPATIENT)
Dept: PAIN MANAGEMENT | Facility: CLINIC | Age: 29
Discharge: HOME | End: 2024-10-10
Payer: OTHER GOVERNMENT

## 2024-10-10 VITALS
HEIGHT: 69 IN | SYSTOLIC BLOOD PRESSURE: 118 MMHG | WEIGHT: 235.89 LBS | TEMPERATURE: 97.6 F | OXYGEN SATURATION: 96 % | HEART RATE: 76 BPM | RESPIRATION RATE: 10 BRPM | BODY MASS INDEX: 34.94 KG/M2 | DIASTOLIC BLOOD PRESSURE: 67 MMHG

## 2024-10-10 DIAGNOSIS — R52 PAIN MANAGEMENT: ICD-10-CM

## 2024-10-10 PROCEDURE — 64635 DESTROY LUMB/SAC FACET JNT: CPT

## 2024-10-10 PROCEDURE — 64636 DESTROY L/S FACET JNT ADDL: CPT

## 2024-10-10 PROCEDURE — 6360000002 HC RX W HCPCS: Performed by: PAIN MEDICINE

## 2024-10-10 PROCEDURE — 2500000003 HC RX 250 WO HCPCS: Performed by: PAIN MEDICINE

## 2024-10-10 RX ORDER — LIDOCAINE HYDROCHLORIDE 5 MG/ML
INJECTION, SOLUTION INFILTRATION; INTRAVENOUS
Status: COMPLETED | OUTPATIENT
Start: 2024-10-10 | End: 2024-10-10

## 2024-10-10 RX ORDER — MIDAZOLAM HYDROCHLORIDE 2 MG/2ML
INJECTION, SOLUTION INTRAMUSCULAR; INTRAVENOUS
Status: COMPLETED | OUTPATIENT
Start: 2024-10-10 | End: 2024-10-10

## 2024-10-10 RX ORDER — BUPIVACAINE HYDROCHLORIDE 2.5 MG/ML
INJECTION, SOLUTION EPIDURAL; INFILTRATION; INTRACAUDAL
Status: COMPLETED | OUTPATIENT
Start: 2024-10-10 | End: 2024-10-10

## 2024-10-10 RX ADMIN — LIDOCAINE HYDROCHLORIDE 10 ML: 5 INJECTION, SOLUTION INFILTRATION at 13:14

## 2024-10-10 RX ADMIN — MIDAZOLAM HYDROCHLORIDE 2 MG: 1 INJECTION, SOLUTION INTRAMUSCULAR; INTRAVENOUS at 13:12

## 2024-10-10 RX ADMIN — BUPIVACAINE HYDROCHLORIDE 10 ML: 2.5 INJECTION, SOLUTION EPIDURAL; INFILTRATION; INTRACAUDAL; PERINEURAL at 13:24

## 2024-10-10 ASSESSMENT — PAIN - FUNCTIONAL ASSESSMENT
PAIN_FUNCTIONAL_ASSESSMENT: PREVENTS OR INTERFERES SOME ACTIVE ACTIVITIES AND ADLS
PAIN_FUNCTIONAL_ASSESSMENT: 0-10
PAIN_FUNCTIONAL_ASSESSMENT: 0-10
PAIN_FUNCTIONAL_ASSESSMENT: NONE - DENIES PAIN

## 2024-10-10 ASSESSMENT — PAIN DESCRIPTION - DESCRIPTORS: DESCRIPTORS: ACHING;DULL

## 2024-10-10 NOTE — OP NOTE
Lumbar Radiofrequency Ablation:  SURGEON: Natalia Manning MD    PRE-OP DIAGNOSIS: M47.817 (lumbosacral spondylosis), M54.5 (low back pain)    POST-OP DIAGNOSIS: Same.    PROCEDURE PERFORMED: Radiofrequency Ablation Medial Branch Nerve at Bilateral L4 - 5 and L5 - S1 facet joint(s).    HISTORY AND INDICATIONS: Satisfactory response with previous RFA/ medial branch blocks at the indicated levels.    Recurrence of painful symptoms attributed to the above diagnosis.    The planned treatment is medically necessary to relieve pain, restore function and or reduce reliance on pain medication.    EBL: minimal    CONSENT: Patient has undergone the educational process with this procedure, is aware and fully understands the risks involved: potential damage to any and all body organs including possible bleeding, infection, nerve injury, paralysis, allergic reaction and headache. Patient also understands that the procedure will be undertaken in a safe, controlled and monitored setting. Patient recognizes that the benefits may include relief from pain and reduction in the oral use of medications. Patient agreed to proceed.     Risks, benefits, and alternatives including postponing the procedure were discussed. The patient does wish to proceed with the procedure at this time.      PROCEDURE NOTE: The patient was taken to the procedure room and placed prone with the appropriate padding and positioning to assure patient comfort and physician access to the procedure site. Time out was performed prior to starting the procedure. Fluoroscopic evaluation was utilized to target the appropriate treatment areas.The skin was prepped with antiseptic solution and draped sterilely. 0.5% lidocaine was used to used anesthetize the skin and subcutaneous tissue.  Under fluoroscopic guidance 22 gauge x 10mm active tip needles were advanced to the medial branch and/or dorsal ramus at the indicated levels to innervate the following facet joints

## 2024-10-10 NOTE — H&P
Pain Pre-Op H&P Note    Natalia Manning MD    HPI: Addison Guajardo  presents with back pain, has done well with RFA in the past and wishes to repeat.    Past Medical History:   Diagnosis Date    Chronic back pain        History reviewed. No pertinent surgical history.    Family History   Problem Relation Age of Onset    Heart Failure Mother     Diabetes Mother     Alcohol Abuse Mother     Diabetes Father        Allergies   Allergen Reactions    Lexapro [Escitalopram] Other (See Comments)     20 mg dose: worse insomnia and less energy       No current outpatient medications on file.    Social History     Tobacco Use    Smoking status: Never    Smokeless tobacco: Never   Substance Use Topics    Alcohol use: Not Currently       Review of Systems:   Focused review of systems was performed, and negative as pertinent to diagnosis, except as stated in HPI.      Physical Exam  Constitutional:       Appearance: Normal appearance.   Pulmonary:      Effort: Pulmonary effort is normal.   Neurological:      Mental Status: alert.   Psychiatric:         Attention and Perception: Attention and perception normal.         Mood and Affect: Mood and affect normal.   Cardiovascular:      Rate: Normal rate.         ASA: 2          Mallampati: 2       Patient's current physical status, medications, medical history, and HPI have been reviewed and updated as appropriate on this date: 10/10/24    Risk/Benefit(s): The risks, benefits, alternatives, and potential complications have been discussed with the patient/family and informed consent has been obtained for the procedure/sedation.    Diagnosis:   Spondylosis      Plan: RFA        Natalia Manning MD

## 2024-10-10 NOTE — DISCHARGE INSTRUCTIONS
You have received a sedative/anesthetic therefore you should not consume any alcoholic beverages for 24 hours.  Do not drive or operate machinery for 24 hours.   Do not take a tub bath for 72 hours after procedure (this includes hot tubs).  You may shower, but avoid hot water to injection site.   Avoid strenuous activity TODAY especially if you experience dizziness.   Remove band-aid the next day.    Wash off any residual iodine 24 hours from today.   Do not use heat, heating pad, or any other heating device over the injection site for 3 days after the procedure.    If you experience pain after your procedure, you may continue with your current pain medication as prescribed.  (DO NOT INCREASE YOUR PAIN MEDICATION WITHOUT TALKING TO DOCTOR)  Soreness and pain at injection site is common, may use ice to reduce soreness.    Call [facility] at [phone] if you experience:   Fever, chills or temperature over 100    Vomiting, headache, persistent stiff neck, nausea or blurred vision   Difficulty urinating or unable to urinate within 8 hours   Increase in weakness, numbness or loss of function of limbs  Increased redness, swelling or drainage at the injection site

## 2024-10-21 ENCOUNTER — OFFICE VISIT (OUTPATIENT)
Dept: PAIN MANAGEMENT | Age: 29
End: 2024-10-21
Payer: OTHER GOVERNMENT

## 2024-10-21 VITALS — HEIGHT: 69 IN | BODY MASS INDEX: 34.94 KG/M2 | WEIGHT: 235.89 LBS

## 2024-10-21 DIAGNOSIS — M47.817 LUMBOSACRAL SPONDYLOSIS WITHOUT MYELOPATHY: Primary | ICD-10-CM

## 2024-10-21 PROCEDURE — 99212 OFFICE O/P EST SF 10 MIN: CPT | Performed by: PAIN MEDICINE

## 2024-10-21 ASSESSMENT — ENCOUNTER SYMPTOMS: BACK PAIN: 1

## 2024-10-21 NOTE — PROGRESS NOTES
HPI:     Back Pain  This is a chronic problem. The current episode started more than 1 year ago. The problem occurs intermittently. The problem has been gradually improving since onset. The pain is present in the lumbar spine. The quality of the pain is described as aching. The pain is at a severity of 3/10. The pain is mild. The pain is Worse during the day. The symptoms are aggravated by twisting, position, bending, lying down, standing, sitting and stress. Stiffness is present In the morning. He has tried home exercises, bed rest, walking, NSAIDs and analgesics for the symptoms.     Reports recent lumbar RFA with 80-90% improvement.  MRI lumbar spine with degenerative changes.    Patient denies any new neurological symptoms. Nobowel or bladder incontinence, no weakness, and no falling.    Review of [facility] does not show any aberrant prescription behavior.       Past Medical History:   Diagnosis Date    Chronic back pain        No past surgical history on file.    Allergies   Allergen Reactions    Lexapro [Escitalopram] Other (See Comments)     20 mg dose: worse insomnia and less energy       No current outpatient medications on file.    Family History   Problem Relation Age of Onset    Heart Failure Mother     Diabetes Mother     Alcohol Abuse Mother     Diabetes Father        Social History     Socioeconomic History    Marital status:      Spouse name: Not on file    Number of children: Not on file    Years of education: Not on file    Highest education level: Not on file   Occupational History    Not on file   Tobacco Use    Smoking status: Never    Smokeless tobacco: Never   Vaping Use    Vaping status: Never Used   Substance and Sexual Activity    Alcohol use: Not Currently    Drug use: Never    Sexual activity: Yes     Partners: Female   Other Topics Concern    Not on file   Social History Narrative    Not on file     Social Determinants of Health     Financial Resource Strain: Low Risk  (2/3/2023)